# Patient Record
Sex: MALE | Race: WHITE | Employment: FULL TIME | URBAN - METROPOLITAN AREA
[De-identification: names, ages, dates, MRNs, and addresses within clinical notes are randomized per-mention and may not be internally consistent; named-entity substitution may affect disease eponyms.]

---

## 2018-05-10 ENCOUNTER — HOSPITAL ENCOUNTER (OUTPATIENT)
Dept: ULTRASOUND IMAGING | Age: 23
Discharge: HOME | End: 2018-05-10
Attending: FAMILY MEDICINE
Payer: COMMERCIAL

## 2018-05-10 DIAGNOSIS — N50.82 SCROTAL PAIN: ICD-10-CM

## 2018-05-10 PROCEDURE — 76870 US EXAM SCROTUM: CPT

## 2018-05-11 NOTE — PROGRESS NOTES
Spoke with patient regarding US and she said its the same pain his had. He said it hurts more when he is sitting down. He also mentioned he has taken Celebrex for a week one time and it didn't help but if there is something you think will help he will take it.  His pharmacy is [facility] in [facility]

## 2018-05-11 NOTE — PROGRESS NOTES
The testicular US didn't show anything abnormal with his left testicle. Maybe it is back pain that is referring to his testicle. Is the pain the same? Would he be willing to try an anti-inflammatory for a few days?

## 2018-05-14 NOTE — PROGRESS NOTES
Anything I could have called in would have been the same kind of medicine. I think the best thing to do is get an abdominal CT to make sure there isn't an abdominal hernia. I will put the order in. He should be getting a call about scheduling it soon.

## 2018-05-14 NOTE — PROGRESS NOTES
Patient was notified and understood. He will be looking out for a call to schedule the CT. He mentioned that the pain is getting worse.

## 2018-05-21 ENCOUNTER — HOSPITAL ENCOUNTER (OUTPATIENT)
Dept: CT IMAGING | Age: 23
Discharge: HOME | End: 2018-05-21
Attending: FAMILY MEDICINE
Payer: COMMERCIAL

## 2018-05-21 DIAGNOSIS — R10.32 LEFT LOWER QUADRANT PAIN: ICD-10-CM

## 2018-05-21 PROCEDURE — 74011636320 HC RX REV CODE- 636/320: Performed by: FAMILY MEDICINE

## 2018-05-21 PROCEDURE — 74177 CT ABD & PELVIS W/CONTRAST: CPT

## 2018-05-21 PROCEDURE — 74011000258 HC RX REV CODE- 258: Performed by: FAMILY MEDICINE

## 2018-05-21 RX ORDER — SODIUM CHLORIDE 0.9 % (FLUSH) 0.9 %
10 SYRINGE (ML) INJECTION
Status: COMPLETED | OUTPATIENT
Start: 2018-05-21 | End: 2018-05-21

## 2018-05-21 RX ADMIN — SODIUM CHLORIDE 100 ML: 900 INJECTION, SOLUTION INTRAVENOUS at 12:21

## 2018-05-21 RX ADMIN — Medication 10 ML: at 12:21

## 2018-05-21 RX ADMIN — IOPAMIDOL 100 ML: 755 INJECTION, SOLUTION INTRAVENOUS at 12:21

## 2018-06-05 ENCOUNTER — HOSPITAL ENCOUNTER (OUTPATIENT)
Dept: MRI IMAGING | Age: 23
Discharge: HOME | End: 2018-06-05
Attending: FAMILY MEDICINE
Payer: COMMERCIAL

## 2018-06-05 DIAGNOSIS — R10.32 LLQ ABDOMINAL PAIN: ICD-10-CM

## 2018-06-05 DIAGNOSIS — M54.50 ACUTE BILATERAL LOW BACK PAIN WITHOUT SCIATICA: ICD-10-CM

## 2018-06-05 PROCEDURE — 72148 MRI LUMBAR SPINE W/O DYE: CPT

## 2018-07-16 PROBLEM — E66.01 SEVERE OBESITY (BMI 35.0-39.9): Status: ACTIVE | Noted: 2018-07-16

## 2018-07-26 ENCOUNTER — HOSPITAL ENCOUNTER (EMERGENCY)
Age: 23
Discharge: HOME | End: 2018-07-26
Attending: EMERGENCY MEDICINE
Payer: OTHER MISCELLANEOUS

## 2018-07-26 VITALS
HEART RATE: 62 BPM | OXYGEN SATURATION: 99 % | RESPIRATION RATE: 16 BRPM | HEIGHT: 70 IN | SYSTOLIC BLOOD PRESSURE: 138 MMHG | DIASTOLIC BLOOD PRESSURE: 80 MMHG | BODY MASS INDEX: 39.37 KG/M2 | TEMPERATURE: 98.1 F | WEIGHT: 275 LBS
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
## 2018-07-26 DIAGNOSIS — S61.452A CAT BITE OF LEFT HAND, INITIAL ENCOUNTER: Primary | ICD-10-CM

## 2018-07-26 LAB
BASOPHILS # BLD: 0 K/UL (ref 0–0.2)
BASOPHILS NFR BLD: 0 % (ref 0–2)
CRP SERPL-MCNC: 0.3 MG/DL (ref 0–0.9)
DIFFERENTIAL METHOD BLD: ABNORMAL
EOSINOPHIL # BLD: 0.2 K/UL (ref 0–0.8)
EOSINOPHIL NFR BLD: 2 % (ref 0.5–7.8)
ERYTHROCYTE [DISTWIDTH] IN BLOOD BY AUTOMATED COUNT: 12.6 % (ref 11.9–14.6)
HCT VFR BLD AUTO: 45.7 % (ref 41.1–50.3)
HGB BLD-MCNC: 16.2 G/DL (ref 13.6–17.2)
IMM GRANULOCYTES # BLD: 0 K/UL (ref 0–0.5)
IMM GRANULOCYTES NFR BLD AUTO: 0 % (ref 0–5)
LYMPHOCYTES # BLD: 2.7 K/UL (ref 0.5–4.6)
LYMPHOCYTES NFR BLD: 33 % (ref 13–44)
MCH RBC QN AUTO: 30.2 PG (ref 26.1–32.9)
MCHC RBC AUTO-ENTMCNC: 35.4 G/DL (ref 31.4–35)
MCV RBC AUTO: 85.3 FL (ref 79.6–97.8)
MONOCYTES # BLD: 0.8 K/UL (ref 0.1–1.3)
MONOCYTES NFR BLD: 10 % (ref 4–12)
NEUTS SEG # BLD: 4.4 K/UL (ref 1.7–8.2)
NEUTS SEG NFR BLD: 55 % (ref 43–78)
PLATELET # BLD AUTO: 264 K/UL (ref 150–450)
PMV BLD AUTO: 10.1 FL (ref 10.8–14.1)
RBC # BLD AUTO: 5.36 M/UL (ref 4.23–5.67)
WBC # BLD AUTO: 8.1 K/UL (ref 4.3–11.1)

## 2018-07-26 PROCEDURE — 96375 TX/PRO/DX INJ NEW DRUG ADDON: CPT | Performed by: EMERGENCY MEDICINE

## 2018-07-26 PROCEDURE — 85025 COMPLETE CBC W/AUTO DIFF WBC: CPT | Performed by: EMERGENCY MEDICINE

## 2018-07-26 PROCEDURE — 96365 THER/PROPH/DIAG IV INF INIT: CPT | Performed by: EMERGENCY MEDICINE

## 2018-07-26 PROCEDURE — 74011000258 HC RX REV CODE- 258: Performed by: EMERGENCY MEDICINE

## 2018-07-26 PROCEDURE — 86140 C-REACTIVE PROTEIN: CPT | Performed by: EMERGENCY MEDICINE

## 2018-07-26 PROCEDURE — 74011250636 HC RX REV CODE- 250/636: Performed by: EMERGENCY MEDICINE

## 2018-07-26 PROCEDURE — 99283 EMERGENCY DEPT VISIT LOW MDM: CPT | Performed by: EMERGENCY MEDICINE

## 2018-07-26 PROCEDURE — 74011250637 HC RX REV CODE- 250/637: Performed by: EMERGENCY MEDICINE

## 2018-07-26 RX ORDER — SODIUM CHLORIDE 0.9 % (FLUSH) 0.9 %
5-10 SYRINGE (ML) INJECTION AS NEEDED
Status: DISCONTINUED | OUTPATIENT
Start: 2018-07-26 | End: 2018-07-26 | Stop reason: HOSPADM

## 2018-07-26 RX ORDER — HYDROCODONE BITARTRATE AND ACETAMINOPHEN 5; 325 MG/1; MG/1
1 TABLET ORAL
Status: COMPLETED | OUTPATIENT
Start: 2018-07-26 | End: 2018-07-26

## 2018-07-26 RX ORDER — HYDROCODONE BITARTRATE AND ACETAMINOPHEN 5; 325 MG/1; MG/1
1 TABLET ORAL
Qty: 12 TAB | Refills: 0 | Status: SHIPPED | OUTPATIENT
Start: 2018-07-26 | End: 2018-08-10

## 2018-07-26 RX ORDER — SODIUM CHLORIDE 0.9 % (FLUSH) 0.9 %
5-10 SYRINGE (ML) INJECTION EVERY 8 HOURS
Status: DISCONTINUED | OUTPATIENT
Start: 2018-07-26 | End: 2018-07-26 | Stop reason: HOSPADM

## 2018-07-26 RX ORDER — KETOROLAC TROMETHAMINE 30 MG/ML
30 INJECTION, SOLUTION INTRAMUSCULAR; INTRAVENOUS
Status: COMPLETED | OUTPATIENT
Start: 2018-07-26 | End: 2018-07-26

## 2018-07-26 RX ADMIN — KETOROLAC TROMETHAMINE 30 MG: 30 INJECTION, SOLUTION INTRAMUSCULAR at 02:47

## 2018-07-26 RX ADMIN — AMPICILLIN SODIUM AND SULBACTAM SODIUM 3 G: 2; 1 INJECTION, POWDER, FOR SOLUTION INTRAMUSCULAR; INTRAVENOUS at 03:05

## 2018-07-26 RX ADMIN — HYDROCODONE BITARTRATE AND ACETAMINOPHEN 1 TABLET: 5; 325 TABLET ORAL at 05:04

## 2018-07-26 NOTE — DISCHARGE INSTRUCTIONS
Animal Bites: Care Instructions  Your Care Instructions  After an animal bite, the biggest concern is infection. The chance of infection depends on the type of animal that bit you, where on your body you were bitten, and your general health. Many animal bites are not closed with stitches, because this can increase the chance of infection. Your bite may take as little as 7 days or as long as several months to heal, depending on how bad it is. Taking good care of your wound at home will help it heal and reduce your chance of infection. The doctor has checked you carefully, but problems can develop later. If you notice any problems or new symptoms, get medical treatment right away. Follow-up care is a key part of your treatment and safety. Be sure to make and go to all appointments, and call your doctor if you are having problems. It's also a good idea to know your test results and keep a list of the medicines you take. How can you care for yourself at home? · If your doctor told you how to care for your wound, follow your doctor's instructions. If you did not get instructions, follow this general advice:  ¨ After 24 to 48 hours, gently wash the wound with clean water 2 times a day. Do not scrub or soak the wound. Don't use hydrogen peroxide or alcohol, which can slow healing. ¨ You may cover the wound with a thin layer of petroleum jelly, such as Vaseline, and a nonstick bandage. ¨ Apply more petroleum jelly and replace the bandage as needed. · After you shower, gently dry the wound with a clean towel. · If your doctor has closed the wound, cover the bandage with a plastic bag before you take a shower. · A small amount of skin redness and swelling around the wound edges and the stitches or staples is normal. Your wound may itch or feel irritated. Do not scratch or rub the wound.   · Ask your doctor if you can take an over-the-counter pain medicine, such as acetaminophen (Tylenol), ibuprofen (Advil, Motrin), or naproxen (Aleve). Read and follow all instructions on the label. · Do not take two or more pain medicines at the same time unless the doctor told you to. Many pain medicines have acetaminophen, which is Tylenol. Too much acetaminophen (Tylenol) can be harmful. · If your bite puts you at risk for rabies, you will get a series of shots over the next few weeks to prevent rabies. Your doctor will tell you when to get the shots. It is very important that you get the full cycle of shots. Follow your doctor's instructions exactly. · You may need a tetanus shot if you have not received one in the last 5 years. · If your doctor prescribed antibiotics, take them as directed. Do not stop taking them just because you feel better. You need to take the full course of antibiotics. When should you call for help? Call your doctor now or seek immediate medical care if:    · The skin near the bite turns cold or pale or it changes color.     · You lose feeling in the area near the bite, or it feels numb or tingly.     · You have trouble moving a limb near the bite.     · You have symptoms of infection, such as:  ¨ Increased pain, swelling, warmth, or redness near the wound. ¨ Red streaks leading from the wound. ¨ Pus draining from the wound. ¨ A fever.     · Blood soaks through the bandage. Oozing small amounts of blood is normal.     · Your pain is getting worse.    Watch closely for changes in your health, and be sure to contact your doctor if you are not getting better as expected. Where can you learn more? Go to http://dante-ike.info/. Enter D365 in the search box to learn more about \"Animal Bites: Care Instructions. \"  Current as of: November 20, 2017  Content Version: 11.7  © 3197-1112 [employer]. Care instructions adapted under license by [employer] (which disclaims liability or warranty for this information).  If you have questions about a medical condition or this instruction, always ask your healthcare professional. [place] [address] any warranty or liability for your use of this information.

## 2018-07-26 NOTE — ED PROVIDER NOTES
HPI Comments: Patient reports being bitten by a feral cat at about noon the day of arrival.he was seen in urgent care and given Augmentin though reports no local wound care done. 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 Since that time the pain does become more swollen and is weeping clear fluid from the puncture wound which is between the index and middle finger in the webspace. 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 reports tetanus status is up-to-date. Patient is a 25 y.o. male presenting with cat bite. The history is provided by the patient. Cat bite    Episode onset: about 14 hours ago. The incident occurred at work. His tetanus status is UTD. Recently, medical care has been given at another facility. Services received include medications given. No past medical history on file. Past Surgical History:   Procedure Laterality Date    HX HEENT  12/17/2010    nose surgery         Family History:   Problem Relation Age of Onset    Hypertension Mother     Heart Disease Father        Social History     Social History    Marital status: SINGLE     Spouse name: N/A    Number of children: N/A    Years of education: N/A     Occupational History    Not on file. Social History Main Topics    Smoking status: Never Smoker    Smokeless tobacco: Never Used    Alcohol use Yes      Comment: socially    Drug use: No    Sexual activity: Not on file     Other Topics Concern    Not on file     Social History Narrative         ALLERGIES: Review of patient's allergies indicates no known allergies. Review of Systems   Constitutional: Negative for chills and fever. All other systems reviewed and are negative.       Vitals:    07/26/18 0144   BP: (!) 148/96   Pulse: (!) 58   Resp: 18   Temp: 98.1 °F (36.7 °C)   SpO2: 99%   Weight: 124.7 kg (275 lb)   Height: 5' 10\" (1.778 m) Physical Exam   Constitutional: He is oriented to person, place, and time. He appears well-developed and well-nourished. No distress. HENT:   Head: Normocephalic and atraumatic. Mouth/Throat: No oropharyngeal exudate. Eyes: Conjunctivae and EOM are normal. Pupils are equal, round, and reactive to light. Neck: Normal range of motion. Neck supple. Cardiovascular: Normal rate, regular rhythm and normal heart sounds. Pulmonary/Chest: Effort normal and breath sounds normal.   Abdominal: Soft. Bowel sounds are normal.   Musculoskeletal: Normal range of motion. He exhibits edema and tenderness. He exhibits no deformity. Right hand is notably swollen but no erythema or warmth is noted there is a clear fluid weeping from a puncture wound in the webspace between the index and middle fingers. Neurological: He is alert and oriented to person, place, and time. Skin: Skin is warm and dry. Psychiatric: He has a normal mood and affect. His behavior is normal.   Nursing note and vitals reviewed. MDM  Number of Diagnoses or Management Options     Amount and/or Complexity of Data Reviewed  Clinical lab tests: ordered and reviewed    Risk of Complications, Morbidity, and/or Mortality  Presenting problems: moderate  Diagnostic procedures: moderate  Management options: moderate  General comments: Differential diagnosis is edema secondary to allergic reaction from the chemicals used to clean the wound versus  Infection from the Bite. Dose of IV Unasyn will be given.     Patient Progress  Patient progress: stable        ED Course       Procedures

## 2018-07-26 NOTE — ED NOTES
\"I was bitten by a cat this morning.  I went to  and they gave me some oral antibiotics but it is getting worse\"

## 2018-07-26 NOTE — ED NOTES
I have reviewed discharge instructions with the patient. The patient verbalized understanding. Patient left ED via Discharge Method: ambulatory to Home with his mom  Opportunity for questions and clarification provided. Patient given 1 scripts. To continue your aftercare when you leave the hospital, you may receive an automated call from our care team to check in on how you are doing. This is a free service and part of our promise to provide the best care and service to meet your aftercare needs.  If you have questions, or wish to unsubscribe from this service please call [phone]. Thank you for Choosing our [facility] Emergency Department.

## 2018-07-26 NOTE — ED TRIAGE NOTES
Pt states that he got bitten by a cat earlier tonight at work. States cat was not up to date on his shots. Patient states that he has gotten all of his rabies immunization shots and that he took the last one today. Noted swelling to L hand; bite marks located between thumb and index fingers. Scratches noted to L wrist.    Was seen at urgent care this afternoon and was prescribed augmentin. States he went back to work and flushed his L hand with chlorahex solution. States since this has become more swollen and painful.

## 2018-07-27 ENCOUNTER — HOSPITAL ENCOUNTER (INPATIENT)
Age: 23
LOS: 3 days | Discharge: HOME | DRG: 603 | End: 2018-07-30
Attending: EMERGENCY MEDICINE | Admitting: HOSPITALIST
Payer: OTHER MISCELLANEOUS

## 2018-07-27 ENCOUNTER — APPOINTMENT (OUTPATIENT)
Dept: GENERAL RADIOLOGY | Age: 23
DRG: 603 | End: 2018-07-27
Attending: EMERGENCY MEDICINE
Payer: OTHER MISCELLANEOUS
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
## 2018-07-27 DIAGNOSIS — S61.452S CAT BITE OF HAND, LEFT, SEQUELA: Primary | ICD-10-CM

## 2018-07-27 DIAGNOSIS — I89.1 LYMPHANGITIS: ICD-10-CM

## 2018-07-27 PROBLEM — L03.114 CELLULITIS OF LEFT UPPER EXTREMITY: Status: ACTIVE | Noted: 2018-07-27

## 2018-07-27 PROBLEM — L03.119 CELLULITIS AND ABSCESS OF HAND: Status: ACTIVE | Noted: 2018-07-27

## 2018-07-27 PROBLEM — L02.519 CELLULITIS AND ABSCESS OF HAND: Status: ACTIVE | Noted: 2018-07-27

## 2018-07-27 LAB
ALBUMIN SERPL-MCNC: 3.6 G/DL (ref 3.5–5)
ALBUMIN/GLOB SERPL: 0.8 {RATIO} (ref 1.2–3.5)
ALP SERPL-CCNC: 72 U/L (ref 50–136)
ALT SERPL-CCNC: 58 U/L (ref 12–65)
ANION GAP SERPL CALC-SCNC: 6 MMOL/L (ref 7–16)
APTT PPP: 25.1 SEC (ref 23.2–35.3)
AST SERPL-CCNC: 47 U/L (ref 15–37)
BASOPHILS # BLD: 0 K/UL (ref 0–0.2)
BASOPHILS NFR BLD: 0 % (ref 0–2)
BILIRUB SERPL-MCNC: 0.9 MG/DL (ref 0.2–1.1)
BUN SERPL-MCNC: 9 MG/DL (ref 6–23)
CALCIUM SERPL-MCNC: 8.9 MG/DL (ref 8.3–10.4)
CHLORIDE SERPL-SCNC: 104 MMOL/L (ref 98–107)
CO2 SERPL-SCNC: 28 MMOL/L (ref 21–32)
CREAT SERPL-MCNC: 0.92 MG/DL (ref 0.8–1.5)
DIFFERENTIAL METHOD BLD: ABNORMAL
EOSINOPHIL # BLD: 0.1 K/UL (ref 0–0.8)
EOSINOPHIL NFR BLD: 1 % (ref 0.5–7.8)
ERYTHROCYTE [DISTWIDTH] IN BLOOD BY AUTOMATED COUNT: 12.5 % (ref 11.9–14.6)
GLOBULIN SER CALC-MCNC: 4.5 G/DL (ref 2.3–3.5)
GLUCOSE SERPL-MCNC: 95 MG/DL (ref 65–100)
HCT VFR BLD AUTO: 46.4 % (ref 41.1–50.3)
HGB BLD-MCNC: 16.6 G/DL (ref 13.6–17.2)
IMM GRANULOCYTES # BLD: 0 K/UL (ref 0–0.5)
IMM GRANULOCYTES NFR BLD AUTO: 0 % (ref 0–5)
INR PPP: 1.1
LACTATE SERPL-SCNC: 0.9 MMOL/L (ref 0.4–2)
LYMPHOCYTES # BLD: 2.3 K/UL (ref 0.5–4.6)
LYMPHOCYTES NFR BLD: 26 % (ref 13–44)
MCH RBC QN AUTO: 30.2 PG (ref 26.1–32.9)
MCHC RBC AUTO-ENTMCNC: 35.8 G/DL (ref 31.4–35)
MCV RBC AUTO: 84.4 FL (ref 79.6–97.8)
MONOCYTES # BLD: 0.6 K/UL (ref 0.1–1.3)
MONOCYTES NFR BLD: 7 % (ref 4–12)
NEUTS SEG # BLD: 5.7 K/UL (ref 1.7–8.2)
NEUTS SEG NFR BLD: 66 % (ref 43–78)
PLATELET # BLD AUTO: 255 K/UL (ref 150–450)
PMV BLD AUTO: 9.6 FL (ref 10.8–14.1)
POTASSIUM SERPL-SCNC: 5.1 MMOL/L (ref 3.5–5.1)
PROT SERPL-MCNC: 8.1 G/DL (ref 6.3–8.2)
PROTHROMBIN TIME: 13.4 SEC (ref 11.5–14.5)
RBC # BLD AUTO: 5.5 M/UL (ref 4.23–5.67)
SODIUM SERPL-SCNC: 138 MMOL/L (ref 136–145)
WBC # BLD AUTO: 8.8 K/UL (ref 4.3–11.1)

## 2018-07-27 PROCEDURE — 80053 COMPREHEN METABOLIC PANEL: CPT | Performed by: EMERGENCY MEDICINE

## 2018-07-27 PROCEDURE — 65660000000 HC RM CCU STEPDOWN

## 2018-07-27 PROCEDURE — 74011000250 HC RX REV CODE- 250: Performed by: HOSPITALIST

## 2018-07-27 PROCEDURE — 73130 X-RAY EXAM OF HAND: CPT

## 2018-07-27 PROCEDURE — 87040 BLOOD CULTURE FOR BACTERIA: CPT | Performed by: HOSPITALIST

## 2018-07-27 PROCEDURE — 85610 PROTHROMBIN TIME: CPT | Performed by: EMERGENCY MEDICINE

## 2018-07-27 PROCEDURE — 74011250636 HC RX REV CODE- 250/636: Performed by: EMERGENCY MEDICINE

## 2018-07-27 PROCEDURE — 99282 EMERGENCY DEPT VISIT SF MDM: CPT | Performed by: EMERGENCY MEDICINE

## 2018-07-27 PROCEDURE — 74011000258 HC RX REV CODE- 258: Performed by: HOSPITALIST

## 2018-07-27 PROCEDURE — 74011250637 HC RX REV CODE- 250/637: Performed by: HOSPITALIST

## 2018-07-27 PROCEDURE — 74011250636 HC RX REV CODE- 250/636: Performed by: HOSPITALIST

## 2018-07-27 PROCEDURE — 83605 ASSAY OF LACTIC ACID: CPT | Performed by: HOSPITALIST

## 2018-07-27 PROCEDURE — 85730 THROMBOPLASTIN TIME PARTIAL: CPT | Performed by: EMERGENCY MEDICINE

## 2018-07-27 PROCEDURE — 85025 COMPLETE CBC W/AUTO DIFF WBC: CPT | Performed by: EMERGENCY MEDICINE

## 2018-07-27 PROCEDURE — 74011250636 HC RX REV CODE- 250/636: Performed by: PHYSICIAN ASSISTANT

## 2018-07-27 RX ORDER — SODIUM CHLORIDE 0.9 % (FLUSH) 0.9 %
5-10 SYRINGE (ML) INJECTION EVERY 8 HOURS
Status: DISCONTINUED | OUTPATIENT
Start: 2018-07-27 | End: 2018-07-30 | Stop reason: HOSPADM

## 2018-07-27 RX ORDER — MORPHINE SULFATE 2 MG/ML
4 INJECTION, SOLUTION INTRAMUSCULAR; INTRAVENOUS ONCE
Status: COMPLETED | OUTPATIENT
Start: 2018-07-27 | End: 2018-07-27

## 2018-07-27 RX ORDER — HYDROCODONE BITARTRATE AND ACETAMINOPHEN 5; 325 MG/1; MG/1
1 TABLET ORAL
Status: DISCONTINUED | OUTPATIENT
Start: 2018-07-27 | End: 2018-07-30 | Stop reason: HOSPADM

## 2018-07-27 RX ORDER — ONDANSETRON 2 MG/ML
4 INJECTION INTRAMUSCULAR; INTRAVENOUS ONCE
Status: COMPLETED | OUTPATIENT
Start: 2018-07-27 | End: 2018-07-27

## 2018-07-27 RX ORDER — SODIUM CHLORIDE 9 MG/ML
100 INJECTION, SOLUTION INTRAVENOUS ONCE
Status: COMPLETED | OUTPATIENT
Start: 2018-07-27 | End: 2018-07-27

## 2018-07-27 RX ORDER — MORPHINE SULFATE 10 MG/ML
4-6 INJECTION, SOLUTION INTRAMUSCULAR; INTRAVENOUS
Status: DISCONTINUED | OUTPATIENT
Start: 2018-07-27 | End: 2018-07-28

## 2018-07-27 RX ORDER — SODIUM CHLORIDE 0.9 % (FLUSH) 0.9 %
5-10 SYRINGE (ML) INJECTION AS NEEDED
Status: DISCONTINUED | OUTPATIENT
Start: 2018-07-27 | End: 2018-07-30 | Stop reason: HOSPADM

## 2018-07-27 RX ORDER — ACETAMINOPHEN 325 MG/1
650 TABLET ORAL
Status: DISCONTINUED | OUTPATIENT
Start: 2018-07-27 | End: 2018-07-30 | Stop reason: HOSPADM

## 2018-07-27 RX ORDER — SODIUM CHLORIDE 9 MG/ML
100 INJECTION, SOLUTION INTRAVENOUS CONTINUOUS
Status: DISPENSED | OUTPATIENT
Start: 2018-07-27 | End: 2018-07-28

## 2018-07-27 RX ORDER — MORPHINE SULFATE 2 MG/ML
2 INJECTION, SOLUTION INTRAMUSCULAR; INTRAVENOUS
Status: DISCONTINUED | OUTPATIENT
Start: 2018-07-27 | End: 2018-07-27

## 2018-07-27 RX ADMIN — MORPHINE SULFATE 4 MG: 2 INJECTION, SOLUTION INTRAMUSCULAR; INTRAVENOUS at 17:38

## 2018-07-27 RX ADMIN — SODIUM CHLORIDE 100 ML/HR: 900 INJECTION, SOLUTION INTRAVENOUS at 17:34

## 2018-07-27 RX ADMIN — ONDANSETRON 4 MG: 2 INJECTION, SOLUTION INTRAMUSCULAR; INTRAVENOUS at 17:37

## 2018-07-27 RX ADMIN — MORPHINE SULFATE 6 MG: 10 INJECTION INTRAVENOUS at 20:37

## 2018-07-27 RX ADMIN — HYDROCODONE BITARTRATE AND ACETAMINOPHEN 1 TABLET: 5; 325 TABLET ORAL at 22:12

## 2018-07-27 RX ADMIN — SODIUM CHLORIDE 600 MG: 900 INJECTION, SOLUTION INTRAVENOUS at 20:40

## 2018-07-27 RX ADMIN — SODIUM CHLORIDE 1 G: 900 INJECTION, SOLUTION INTRAVENOUS at 18:13

## 2018-07-27 NOTE — ED NOTES
TRANSFER - OUT REPORT: 
 
Verbal report given to RN Kylah(name) on Kourtney Rodriguez  being transferred to 220(unit) for routine progression of care Report consisted of patients Situation, Background, Assessment and  
Recommendations(SBAR). Information from the following report(s) SBAR, [facility], ED Notes was reviewed with the receiving nurse. Lines:    
 
Opportunity for questions and clarification was provided. Patient transported with: 
 iv medication

## 2018-07-27 NOTE — IP AVS SNAPSHOT
Nicole Garcia 
 
 
 [address] [facility] 
[phone] Patient: Bridgett Livingston MRN: [id number] About your hospitalization You were admitted on:  July 27, 2018 [facility] You were discharged on:  July 30, 2018 Why you were hospitalized Your primary diagnosis was:  Cellulitis Of Left Upper Extremity Your diagnoses also included:  Severe Obesity (Bmi 35.0-39.9) ([facility]) Follow-up Information Follow up With Details Comments Contact Info Pankaj Daigle DO On 8/13/2018 8:45 WILL SEE PA [address] 
[phone] Your Scheduled Appointments Thursday August 09, 2018  1:15 PM EDT New Patient with Mario Farley MD  
[facility] [address] [place] [address] [facility]  
[phone] Monday August 13, 2018  8:45 AM EDT Office Visit with Felipa Garcia PA-C [address] [facility] [address]  
[phone] Wednesday August 22, 2018  8:15 AM EDT Office Visit with Eve Caruso MD  
[facility] ([facility] [address] [facility] [address] [facility] [address]  
[phone] Discharge Orders None A check katie indicates which time of day the medication should be taken. My Medications START taking these medications Instructions Each Dose to Equal  
 Morning Noon Evening Bedtime  
 doxycycline 100 mg capsule Commonly known as:  David Setter Your next dose is: Take as directed Take 1 Cap by mouth two (2) times a day. 100 mg CONTINUE taking these medications Instructions Each Dose to Equal  
 Morning Noon Evening Bedtime HYDROcodone-acetaminophen 5-325 mg per tablet Commonly known as:  Benetta Pock  
 Your next dose is: Take on as needed schedule Take 1 Tab by mouth every four (4) hours as needed for Pain. Max Daily Amount: 6 Tabs. 1 Tab STOP taking these medications   
 amoxicillin-clavulanate 875-125 mg per tablet Commonly known as:  AUGMENTIN Where to Get Your Medications Information on where to get these meds will be given to you by the nurse or doctor. ! Ask your nurse or doctor about these medications  
  doxycycline 100 mg capsule Opioid Education Prescription Opioids: What You Need to Know: 
 
 
 
F-face looks uneven A-arms unable to move or move unevenly S-speech slurred or non-existent T-time-call 911 as soon as signs and symptoms begin-DO NOT go Back to bed or wait to see if you get better-TIME IS BRAIN. Warning Signs of HEART ATTACK Call 911 if you have these symptoms: 
? Chest discomfort. Most heart attacks involve discomfort in the center of the chest that lasts more than a few minutes, or that goes away and comes back. It can feel like uncomfortable pressure, squeezing, fullness, or pain. ? Discomfort in other areas of the upper body. Symptoms can include pain or discomfort in one or both arms, the back, neck, jaw, or stomach. ? Shortness of breath with or without chest discomfort. ? Other signs may include breaking out in a cold sweat, nausea, or lightheadedness. Don't wait more than five minutes to call [address]! Fast action can save your life. Calling 911 is almost always the fastest way to get lifesaving treatment. Emergency Medical Services staff can begin treatment when they arrive  up to an hour sooner than if someone gets to the hospital by car. The discharge information has been reviewed with the patient. The patient verbalized understanding. Discharge medications reviewed with the patient and appropriate educational materials and side effects teaching were provided. ___________________________________________________________________________________________________________________________________ Cellulitis: Care Instructions Your Care Instructions Cellulitis is a skin infection caused by bacteria, most often strep or staph. It often occurs after a break in the skin from a scrape, cut, bite, or puncture, or after a rash. Cellulitis may be treated without doing tests to find out what caused it. But your doctor may do tests, if needed, to look for a specific bacteria, like methicillin-resistant Staphylococcus aureus (MRSA). The doctor has checked you carefully, but problems can develop later. If you notice any problems or new symptoms, get medical treatment right away. Follow-up care is a key part of your treatment and safety. Be sure to make and go to all appointments, and call your doctor if you are having problems. It's also a good idea to know your test results and keep a list of the medicines you take. How can you care for yourself at home? · Take your antibiotics as directed. Do not stop taking them just because you feel better. You need to take the full course of antibiotics. · Prop up the infected area on pillows to reduce pain and swelling. Try to keep the area above the level of your heart as often as you can.  
· If your doctor told you how to care for your wound, follow your doctor's instructions. If you did not get instructions, follow this general advice: ¨ Wash the wound with clean water 2 times a day. Don't use hydrogen peroxide or alcohol, which can slow healing. ¨ You may cover the wound with a thin layer of petroleum jelly, such as Vaseline, and a nonstick bandage. ¨ Apply more petroleum jelly and replace the bandage as needed. · Be safe with medicines. Take pain medicines exactly as directed. ¨ If the doctor gave you a prescription medicine for pain, take it as prescribed. ¨ If you are not taking a prescription pain medicine, ask your doctor if you can take an over-the-counter medicine. To prevent cellulitis in the future · Try to prevent cuts, scrapes, or other injuries to your skin. Cellulitis most often occurs where there is a break in the skin. · If you get a scrape, cut, mild burn, or bite, wash the wound with clean water as soon as you can to help avoid infection. Don't use hydrogen peroxide or alcohol, which can slow healing. · If you have swelling in your legs (edema), support stockings and good skin care may help prevent leg sores and cellulitis. · Take care of your feet, especially if you have diabetes or other conditions that increase the risk of infection. Wear shoes and socks. Do not go barefoot. If you have athlete's foot or other skin problems on your feet, talk to your doctor about how to treat them. When should you call for help? Call your doctor now or seek immediate medical care if: 
  · You have signs that your infection is getting worse, such as: 
¨ Increased pain, swelling, warmth, or redness. ¨ Red streaks leading from the area. ¨ Pus draining from the area. ¨ A fever.  
  · You get a rash.  
 Watch closely for changes in your health, and be sure to contact your doctor if: 
  · You do not get better as expected. Where can you learn more? Go to http://dante-ike.info/. Emmanuel Camara in the search box to learn more about \"Cellulitis: Care Instructions. \" Current as of: May 10, 2017 Content Version: 11.7 © 0920-9064 [employer]. Care instructions adapted under license by [employer] (which disclaims liability or warranty for this information). If you have questions about a medical condition or this instruction, always ask your healthcare professional. [address] any warranty or liability for your use of this information. EasyCopay Announcement We are excited to announce that we are making your provider's discharge notes available to you in EasyCopay. You will see these notes when they are completed and signed by the physician that discharged you from your recent hospital stay. If you have any questions or concerns about any information you see in EasyCopay, please call the Health Information Department where you were seen or reach out to your Primary Care Provider for more information about your plan of care. Introducing [facility]! Miko Pal introduces EasyCopay patient portal. Now you can access parts of your medical record, email your doctor's office, and request medication refills online. 1. In your internet browser, go to [email or web address] 2. Click on the First Time User? Click Here link in the Sign In box. You will see the New Member Sign Up page. 3. Enter your EasyCopay Access Code exactly as it appears below. You will not need to use this code after youve completed the sign-up process. If you do not sign up before the expiration date, you must request a new code. · EasyCopay Access Code: [email or web address] Expires: 8/2/2018 10:30 AM 
 
4. Enter the last four digits of your Social Security Number (xxxx) and Date of Birth (mm/dd/yyyy) as indicated and click Submit. You will be taken to the next sign-up page. 5. Create a Ayi Laile ID. This will be your Ayi Laile login ID and cannot be changed, so think of one that is secure and easy to remember. 6. Create a Ayi Laile password. You can change your password at any time. 7. Enter your Password Reset Question and Answer. This can be used at a later time if you forget your password. 8. Enter your e-mail address. You will receive e-mail notification when new information is available in [facility] 9. Click Sign Up. You can now view and download portions of your medical record. 10. Click the Download Summary menu link to download a portable copy of your medical information. If you have questions, please visit the Frequently Asked Questions section of the Ayi Laile website. Remember, Ayi Laile is NOT to be used for urgent needs. For medical emergencies, dial 911. Now available from your iPhone and Android! Introducing Evgeny Denney As a [employer] patient, I wanted to make you aware of our electronic visit tool called Evgeny Denney. [employer] 24/7 allows you to connect within minutes with a medical provider 24 hours a day, seven days a week via a mobile device or tablet or logging into a secure website from your computer. You can access Evgeny Denney from anywhere in the [place]. A virtual visit might be right for you when you have a simple condition and feel like you just dont want to get out of bed, or cant get away from work for an appointment, when your regular [employer] provider is not available (evenings, weekends or holidays), or when youre out of town and need minor care. Electronic visits cost only $49 and if the [employer] 24/7 provider determines a prescription is needed to treat your condition, one can be electronically transmitted to a nearby pharmacy*. Please take a moment to enroll today if you have not already done so. The enrollment process is free and takes just a few minutes.   To enroll, please download the lmbang 24/7 sunshine to your tablet or phone, or visit [email or web address] to enroll on your computer. And, as an [address] [place] [address] patient with a [employer] account, the results of your visits will be scanned into your electronic medical record and your primary care provider will be able to view the scanned results. We urge you to continue to see your regular [facility] provider for your ongoing medical care. And while your primary care provider may not be the one available when you seek a [employer] [facility] virtual visit, the peace of mind you get from getting a real diagnosis real time can be priceless. For more information on [employer] [facility], view our Frequently Asked Questions (FAQs) at [email or web address]. Sincerely, 
 
Elsa Dubose MD 
Chief Medical Officer Robin Josy Johnson *:  certain medications cannot be prescribed via [employer] Unresulted Labs-Please follow up with your PCP about these lab tests Order Current Status CULTURE, BLOOD Preliminary result CULTURE, BLOOD Preliminary result Providers Seen During Your Hospitalization Provider Specialty Primary office phone Antonia Cabral MD Emergency Medicine [phone] Malcolm Burgess MD [facility] [phone] Your Primary Care Physician (PCP) Primary Care Physician Office Phone Office Fax Jack Yao [phone] You are allergic to the following No active allergies Recent Documentation Height Weight BMI Smoking Status 1.778 m 122.5 kg 38.74 kg/m2 Never Smoker Emergency Contacts Name Discharge Info Relation Home Work Mobile [facility] HOSPITAL DISCHARGE CAREGIVER [3] Mother [14] [phone] Patient Belongings  The following personal items are in your possession at time of discharge: 
  Dental Appliances: None         Home Medications: None   Jewelry: None Clothing: At bedside, Footwear, Pants, Shirt, Socks, Undergarments    Other Valuables: [employer] Please provide this summary of care documentation to your next provider. Signatures-by signing, you are acknowledging that this After Visit Summary has been reviewed with you and you have received a copy. Patient Signature:  ____________________________________________________________ Date:  ____________________________________________________________  
  
Marti Clif Provider Signature:  ____________________________________________________________ Date:  ____________________________________________________________

## 2018-07-27 NOTE — ED PROVIDER NOTES
Patient is a 25 y.o. male presenting with cat bite. The history is provided by the patient. Cat bite The incident occurred more than 2 days ago. The incident occurred at work. He came to the ER via personal transport. There is an injury to the left hand. The pain is moderate. It is unknown if a foreign body is present. Associated symptoms include numbness, nausea, tingling and weakness. Pertinent negatives include no chest pain, no fussiness, no visual disturbance, no abdominal pain, no bowel incontinence, no vomiting, no bladder incontinence, no headaches, no hearing loss, no focal weakness, no cough and no difficulty breathing. There have been no prior injuries to these areas. He is right-handed. His tetanus status is UTD. He has been behaving normally. There were no sick contacts. Recently, medical care has been given by the PCP and at another facility. Services received include medications given and tests performed. Healthy 67-year-old male presenting for worsening pain and swelling of his left hand. He was bitten by a cat 2 days ago. She was started on Augmentin and the pain worsened. He presented to his PCP was given IV antibiotics. His pain continued to worsen and he was sent here for referral and possible evaluation by hand surgery. Patient is concerned because pain and swelling as well as redness which originated over the second MCP has now extended across the wrist and up the forearm. History reviewed. No pertinent past medical history. Past Surgical History:  
Procedure Laterality Date  HX HEENT  12/17/2010  
 nose surgery Family History:  
Problem Relation Age of Onset  Hypertension Mother  Heart Disease Father Social History Social History  Marital status: SINGLE Spouse name: N/A  
 Number of children: N/A  
 Years of education: N/A Occupational History  Not on file. Social History Main Topics  Smoking status: Never Smoker  Smokeless tobacco: Never Used  Alcohol use Yes Comment: socially  Drug use: No  
 Sexual activity: Not on file Other Topics Concern  Not on file Social History Narrative ALLERGIES: Review of patient's allergies indicates no known allergies. Review of Systems HENT: Negative for hearing loss. Eyes: Negative for visual disturbance. Respiratory: Negative for cough. Cardiovascular: Negative for chest pain. Gastrointestinal: Positive for nausea. Negative for abdominal pain, bowel incontinence and vomiting. Genitourinary: Negative for bladder incontinence. Neurological: Positive for tingling, weakness and numbness. Negative for focal weakness and headaches. All other systems reviewed and are negative. Vitals:  
 07/27/18 1454 BP: (!) 130/92 Pulse: 79 Resp: 18 Temp: 98.6 °F (37 °C) SpO2: 96% Weight: 122.5 kg (270 lb) Height: 5' 10\" (1.778 m) Physical Exam  
Constitutional: He is oriented to person, place, and time. He appears well-developed and well-nourished. HENT:  
Head: Normocephalic and atraumatic. Eyes: Conjunctivae are normal. Pupils are equal, round, and reactive to light. Neck: Normal range of motion. Neck supple. Cardiovascular: Normal rate and regular rhythm. Pulmonary/Chest: Effort normal and breath sounds normal.  
Abdominal: Soft. Bowel sounds are normal.  
Musculoskeletal:  
Obvious puncture wound over the medial and lateral aspects of the second MCPof the left hand, surrounding soft tissue swelling, erythema, lymphangitis extending up the heel aspect of the hand, forearm towards the elbow, tenderness to palpation, tenderness to flexion and extension of the hand, reported decreased sensation in the edematous portions of the hand. Neurological: He is alert and oriented to person, place, and time. Skin: There is erythema. Positive for swelling Nursing note and vitals reviewed. MDM Number of Diagnoses or Management Options Cat bite of hand, left, sequela:  
Lymphangitis:  
Diagnosis management comments: 81-year-old male presenting for Bite to the left hand that is progressively worsening in spite of antibiotic treatment and pain control. Concern for tenosynovitis versus worsening lymphangitis secondary to cat bite. Patient has already been seen several times for this injury. Started on oral antibiotics but swelling pain is worsening. Given 1 dose of IV antibiotics pain and swelling is worsening. 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 Tetanus has been updated prior to this visit. Amount and/or Complexity of Data Reviewed Clinical lab tests: ordered and reviewed Tests in the radiology section of CPT®: ordered and reviewed Tests in the medicine section of CPT®: ordered and reviewed Discuss the patient with other providers: yes Independent visualization of images, tracings, or specimens: yes Risk of Complications, Morbidity, and/or Mortality Presenting problems: high Diagnostic procedures: high Management options: high Patient Progress Patient progress: improved ED Course Comment By Time Consult to the hospitalist service for admission, IV antibiotics and possible hand surgery consultation Juve Hearn MD 07/27 9978 Procedures

## 2018-07-27 NOTE — ED TRIAGE NOTES
Pt bit by cat on Wednesday. Seen at urgent care, placed on antibiotics. Worsened pain on Thursday, came to ED, given IV antibiotics. Worsening pain today. Seen at PCP. Sent here by PCP to be evaluated by surgeon. Pt reports left hand numbness and significant swelling present in triage.

## 2018-07-27 NOTE — PROGRESS NOTES
TRANSFER - IN REPORT: 
 
Verbal report received from Mati Brito RN(name) on Elisabeth Colón  being received from ED(unit) for routine progression of care Report consisted of patients Situation, Background, Assessment and  
Recommendations(SBAR). Information from the following report(s) SBAR, Kardex, ED Summary, OR Summary, Procedure Summary, Intake/Output and MAR was reviewed with the receiving nurse. Opportunity for questions and clarification was provided. Assessment completed upon patients arrival to unit and care assumed.

## 2018-07-27 NOTE — H&P
HOSPITALIST H&P/CONSULT 
NAME:  Carol Angulo Age:  25 y.o. 
:   1995 DOS:               18 MRN:   [id number] PCP: Judge Juan DO Consulting MD: Treatment Team: Attending Provider: Juice Samuel MD; Primary Nurse: Harriet Suazo RN 
 
HPI:  
Mr. Kim Morales with PMHx of morbid obesity who presented to Ed complaining of L hand erythema, swelling and pain after a cat bite at work 3 days prior. Works at a  office. laced on PO Augmentin for last 2 days, although despite the medications his symptoms got worse and now there are present up to forearm. Tetanus up todate. ED work-up showed WBC:8.8. xrays L hand pending. Started on Cefepime per Ed provider. 10+ point ROS done and is negative except as noted in HPI. History reviewed. No pertinent past medical history. Past Surgical History:  
Procedure Laterality Date  HX HEENT  2010  
 nose surgery Prior to Admission Medications Prescriptions Last Dose Informant Patient Reported? Taking? HYDROcodone-acetaminophen (NORCO) 5-325 mg per tablet   No No  
Sig: Take 1 Tab by mouth every four (4) hours as needed for Pain. Max Daily Amount: 6 Tabs. amoxicillin-clavulanate (AUGMENTIN) 875-125 mg per tablet   Yes No  
Sig: TAKE 1 TABLET BY MOUTH EVERY 12 HOURS Facility-Administered Medications: None Home meds reconciled. No Known Allergies Social History Substance Use Topics  Smoking status: Never Smoker  Smokeless tobacco: Never Used  Alcohol use Yes Comment: socially Family History Problem Relation Age of Onset  Hypertension Mother  Heart Disease Father I personally reviewed home medications, social and family history. Immunization History Administered Date(s) Administered  Influenza Vaccine PF 2011, 10/22/2012, 10/18/2013, 12/15/2015, 2015  Meningococcal (MCV4O) Vaccine 2014  TB Skin Test (PPD) Intradermal 2015 Objective:  
 
Patient Vitals for the past 24 hrs: 
 Temp Pulse Resp BP SpO2  
18 98.6 °F (37 °C) 79 18 (!) 130/92 96 % Temp (24hrs), Av.4 °F (36.9 °C), Min:98.2 °F (36.8 °C), Max:98.6 °F (37 °C) Oxygen Therapy O2 Sat (%): 96 % (18 145) O2 Device: Room air (18) Oxygen Therapy O2 Sat (%): 96 % (18) O2 Device: Room air (18) Physical Exam: 
General:         Alert, cooperative, no distress . Obese HEENT:               NCAT. No obvious deformity. Nares normal. No drainage Lungs:                  CTABL. No wheezing/rhonchi/rales  RA Cardiovascular:   RRR. No m/r/g. No pedal edema b/l. +2 PT/DT pulses b/l. Abdomen:       S/nt/nd. Bowel sounds normal. .  
Skin:         L hand with erythema and swelling up to lower 1/3 of fore arm. palpating radial pulse. Puncture wound over second MCP. Not Jaundiced Neurologic:    AAOx3. Go Broach No gross focal deficit. Moves all extremities. Psychiatric:         Good mood. Normal affect. Denies any SI/HI. Data Review:  
Recent Results (from the past 24 hour(s)) CBC WITH AUTOMATED DIFF Collection Time: 18  3:03 PM  
Result Value Ref Range WBC 8.8 4.3 - 11.1 K/uL  
 RBC 5.50 4.23 - 5.67 M/uL  
 HGB 16.6 13.6 - 17.2 g/dL HCT 46.4 41.1 - 50.3 % MCV 84.4 79.6 - 97.8 FL  
 MCH 30.2 26.1 - 32.9 PG  
 MCHC 35.8 (H) 31.4 - 35.0 g/dL  
 RDW 12.5 11.9 - 14.6 % PLATELET 682 994 - 932 K/uL MPV 9.6 (L) 10.8 - 14.1 FL  
 DF AUTOMATED NEUTROPHILS 66 43 - 78 % LYMPHOCYTES 26 13 - 44 % MONOCYTES 7 4.0 - 12.0 % EOSINOPHILS 1 0.5 - 7.8 % BASOPHILS 0 0.0 - 2.0 % IMMATURE GRANULOCYTES 0 0.0 - 5.0 %  
 ABS. NEUTROPHILS 5.7 1.7 - 8.2 K/UL  
 ABS. LYMPHOCYTES 2.3 0.5 - 4.6 K/UL  
 ABS. MONOCYTES 0.6 0.1 - 1.3 K/UL  
 ABS. EOSINOPHILS 0.1 0.0 - 0.8 K/UL  
 ABS. BASOPHILS 0.0 0.0 - 0.2 K/UL  
 ABS. IMM. GRANS. 0.0 0.0 - 0.5 K/UL METABOLIC PANEL, COMPREHENSIVE  Collection Time: 18 3:03 PM  
Result Value Ref Range Sodium 138 136 - 145 mmol/L Potassium 5.1 3.5 - 5.1 mmol/L Chloride 104 98 - 107 mmol/L  
 CO2 28 21 - 32 mmol/L Anion gap 6 (L) 7 - 16 mmol/L Glucose 95 65 - 100 mg/dL BUN 9 6 - 23 MG/DL Creatinine 0.92 0.8 - 1.5 MG/DL  
 GFR est AA >60 >60 ml/min/1.73m2 GFR est non-AA >60 >60 ml/min/1.73m2 Calcium 8.9 8.3 - 10.4 MG/DL Bilirubin, total 0.9 0.2 - 1.1 MG/DL  
 ALT (SGPT) 58 12 - 65 U/L  
 AST (SGOT) 47 (H) 15 - 37 U/L Alk. phosphatase 72 50 - 136 U/L Protein, total 8.1 6.3 - 8.2 g/dL Albumin 3.6 3.5 - 5.0 g/dL Globulin 4.5 (H) 2.3 - 3.5 g/dL A-G Ratio 0.8 (L) 1.2 - 3.5 All Micro Results Procedure Component Value Units Date/Time CULTURE, BLOOD [[id number]] Collected:  07/27/18 1810 Order Status:  Completed Specimen:  Blood from Blood Updated:  07/27/18 1816 CULTURE, BLOOD [[id number]] Collected:  07/27/18 1754 Order Status:  Completed Specimen:  Blood from Blood Updated:  07/27/18 1801 Imaging /Procedures /Studies: 
I personally reviewed all labs, imaging, and other studies this admission: No results found. Assessment and Plan: Active Hospital Problems Diagnosis Date Noted  Cellulitis of left upper extremity 07/27/2018  Severe obesity (BMI 35.0-39.9) ([facility] [address] 07/16/2018 PLAN 
- continue Cefepime and Add IV Clindamycin. 
- get blood cultures - IV hydration 
- consider MRI L hand- consult ENT - appreciate the help 
- educated regarding obesity DVT ppx: SCDs Code status: Full CODE  
Estimated LOS:  2-3 days Risk assessment: high Plan of care discussed with: patient and his mother at bedside in ED. Care team. 
Mr. Beatriz Gonzalez will need at least 2 midnights of admission. Tariq Bush MD 
07/27/18

## 2018-07-28 ENCOUNTER — APPOINTMENT (OUTPATIENT)
Dept: MRI IMAGING | Age: 23
DRG: 603 | End: 2018-07-28
Attending: ORTHOPAEDIC SURGERY
Payer: OTHER MISCELLANEOUS

## 2018-07-28 LAB
ALBUMIN SERPL-MCNC: 3.3 G/DL (ref 3.5–5)
ALBUMIN/GLOB SERPL: 0.9 {RATIO} (ref 1.2–3.5)
ALP SERPL-CCNC: 68 U/L (ref 50–136)
ALT SERPL-CCNC: 44 U/L (ref 12–65)
ANION GAP SERPL CALC-SCNC: 6 MMOL/L (ref 7–16)
AST SERPL-CCNC: 18 U/L (ref 15–37)
BASOPHILS # BLD: 0 K/UL (ref 0–0.2)
BASOPHILS NFR BLD: 0 % (ref 0–2)
BILIRUB SERPL-MCNC: 0.8 MG/DL (ref 0.2–1.1)
BUN SERPL-MCNC: 9 MG/DL (ref 6–23)
CALCIUM SERPL-MCNC: 8.4 MG/DL (ref 8.3–10.4)
CHLORIDE SERPL-SCNC: 105 MMOL/L (ref 98–107)
CO2 SERPL-SCNC: 29 MMOL/L (ref 21–32)
CREAT SERPL-MCNC: 0.95 MG/DL (ref 0.8–1.5)
DIFFERENTIAL METHOD BLD: ABNORMAL
EOSINOPHIL # BLD: 0.2 K/UL (ref 0–0.8)
EOSINOPHIL NFR BLD: 2 % (ref 0.5–7.8)
ERYTHROCYTE [DISTWIDTH] IN BLOOD BY AUTOMATED COUNT: 12.4 % (ref 11.9–14.6)
GLOBULIN SER CALC-MCNC: 3.6 G/DL (ref 2.3–3.5)
GLUCOSE SERPL-MCNC: 106 MG/DL (ref 65–100)
HCT VFR BLD AUTO: 43.6 % (ref 41.1–50.3)
HGB BLD-MCNC: 15.2 G/DL (ref 13.6–17.2)
IMM GRANULOCYTES # BLD: 0 K/UL (ref 0–0.5)
IMM GRANULOCYTES NFR BLD AUTO: 0 % (ref 0–5)
LYMPHOCYTES # BLD: 2.8 K/UL (ref 0.5–4.6)
LYMPHOCYTES NFR BLD: 30 % (ref 13–44)
MCH RBC QN AUTO: 29.8 PG (ref 26.1–32.9)
MCHC RBC AUTO-ENTMCNC: 34.9 G/DL (ref 31.4–35)
MCV RBC AUTO: 85.5 FL (ref 79.6–97.8)
MONOCYTES # BLD: 0.9 K/UL (ref 0.1–1.3)
MONOCYTES NFR BLD: 9 % (ref 4–12)
NEUTS SEG # BLD: 5.5 K/UL (ref 1.7–8.2)
NEUTS SEG NFR BLD: 59 % (ref 43–78)
PLATELET # BLD AUTO: 228 K/UL (ref 150–450)
PMV BLD AUTO: 9.5 FL (ref 10.8–14.1)
POTASSIUM SERPL-SCNC: 3.6 MMOL/L (ref 3.5–5.1)
PROT SERPL-MCNC: 6.9 G/DL (ref 6.3–8.2)
RBC # BLD AUTO: 5.1 M/UL (ref 4.23–5.67)
SODIUM SERPL-SCNC: 140 MMOL/L (ref 136–145)
WBC # BLD AUTO: 9.3 K/UL (ref 4.3–11.1)

## 2018-07-28 PROCEDURE — 74011250637 HC RX REV CODE- 250/637: Performed by: FAMILY MEDICINE

## 2018-07-28 PROCEDURE — 65660000000 HC RM CCU STEPDOWN

## 2018-07-28 PROCEDURE — 80053 COMPREHEN METABOLIC PANEL: CPT | Performed by: HOSPITALIST

## 2018-07-28 PROCEDURE — 74011000258 HC RX REV CODE- 258: Performed by: HOSPITALIST

## 2018-07-28 PROCEDURE — 74011250636 HC RX REV CODE- 250/636: Performed by: PHYSICIAN ASSISTANT

## 2018-07-28 PROCEDURE — 77030020263 HC SOL INJ SOD CL0.9% LFCR 1000ML

## 2018-07-28 PROCEDURE — 74011250636 HC RX REV CODE- 250/636: Performed by: HOSPITALIST

## 2018-07-28 PROCEDURE — 73220 MRI UPPR EXTREMITY W/O&W/DYE: CPT

## 2018-07-28 PROCEDURE — 74011250636 HC RX REV CODE- 250/636: Performed by: INTERNAL MEDICINE

## 2018-07-28 PROCEDURE — 36415 COLL VENOUS BLD VENIPUNCTURE: CPT | Performed by: HOSPITALIST

## 2018-07-28 PROCEDURE — 74011250637 HC RX REV CODE- 250/637: Performed by: INTERNAL MEDICINE

## 2018-07-28 PROCEDURE — 74011250636 HC RX REV CODE- 250/636: Performed by: FAMILY MEDICINE

## 2018-07-28 PROCEDURE — A9575 INJ GADOTERATE MEGLUMI 0.1ML: HCPCS | Performed by: HOSPITALIST

## 2018-07-28 PROCEDURE — 85025 COMPLETE CBC W/AUTO DIFF WBC: CPT | Performed by: HOSPITALIST

## 2018-07-28 PROCEDURE — 74011000250 HC RX REV CODE- 250: Performed by: HOSPITALIST

## 2018-07-28 PROCEDURE — 74011250637 HC RX REV CODE- 250/637: Performed by: HOSPITALIST

## 2018-07-28 PROCEDURE — 94762 N-INVAS EAR/PLS OXIMTRY CONT: CPT

## 2018-07-28 RX ORDER — KETOROLAC TROMETHAMINE 15 MG/ML
15 INJECTION, SOLUTION INTRAMUSCULAR; INTRAVENOUS
Status: DISCONTINUED | OUTPATIENT
Start: 2018-07-28 | End: 2018-07-30 | Stop reason: HOSPADM

## 2018-07-28 RX ORDER — GADOTERATE MEGLUMINE 376.9 MG/ML
5 INJECTION INTRAVENOUS
Status: COMPLETED | OUTPATIENT
Start: 2018-07-28 | End: 2018-07-28

## 2018-07-28 RX ORDER — SODIUM CHLORIDE 0.9 % (FLUSH) 0.9 %
10 SYRINGE (ML) INJECTION
Status: COMPLETED | OUTPATIENT
Start: 2018-07-28 | End: 2018-07-28

## 2018-07-28 RX ORDER — DIPHENHYDRAMINE HCL 25 MG
25 CAPSULE ORAL
Status: DISCONTINUED | OUTPATIENT
Start: 2018-07-28 | End: 2018-07-28

## 2018-07-28 RX ORDER — MORPHINE SULFATE 2 MG/ML
2 INJECTION, SOLUTION INTRAMUSCULAR; INTRAVENOUS
Status: DISCONTINUED | OUTPATIENT
Start: 2018-07-28 | End: 2018-07-29

## 2018-07-28 RX ORDER — ONDANSETRON 2 MG/ML
4 INJECTION INTRAMUSCULAR; INTRAVENOUS
Status: DISCONTINUED | OUTPATIENT
Start: 2018-07-28 | End: 2018-07-30 | Stop reason: HOSPADM

## 2018-07-28 RX ORDER — DIPHENHYDRAMINE HCL 25 MG
25 CAPSULE ORAL
Status: DISCONTINUED | OUTPATIENT
Start: 2018-07-28 | End: 2018-07-30 | Stop reason: HOSPADM

## 2018-07-28 RX ORDER — GADOTERATE MEGLUMINE 376.9 MG/ML
25 INJECTION INTRAVENOUS
Status: COMPLETED | OUTPATIENT
Start: 2018-07-28 | End: 2018-07-28

## 2018-07-28 RX ADMIN — MORPHINE SULFATE 6 MG: 10 INJECTION INTRAVENOUS at 08:17

## 2018-07-28 RX ADMIN — MORPHINE SULFATE 6 MG: 10 INJECTION INTRAVENOUS at 03:48

## 2018-07-28 RX ADMIN — SODIUM CHLORIDE 600 MG: 900 INJECTION, SOLUTION INTRAVENOUS at 03:49

## 2018-07-28 RX ADMIN — SODIUM CHLORIDE 600 MG: 900 INJECTION, SOLUTION INTRAVENOUS at 12:27

## 2018-07-28 RX ADMIN — SODIUM CHLORIDE 100 ML/HR: 900 INJECTION, SOLUTION INTRAVENOUS at 05:59

## 2018-07-28 RX ADMIN — MORPHINE SULFATE 6 MG: 10 INJECTION INTRAVENOUS at 12:26

## 2018-07-28 RX ADMIN — DIPHENHYDRAMINE HYDROCHLORIDE 25 MG: 25 CAPSULE ORAL at 01:52

## 2018-07-28 RX ADMIN — HYDROCODONE BITARTRATE AND ACETAMINOPHEN 1 TABLET: 5; 325 TABLET ORAL at 05:59

## 2018-07-28 RX ADMIN — ONDANSETRON 4 MG: 2 INJECTION, SOLUTION INTRAMUSCULAR; INTRAVENOUS at 01:52

## 2018-07-28 RX ADMIN — DIPHENHYDRAMINE HYDROCHLORIDE 25 MG: 25 CAPSULE ORAL at 13:53

## 2018-07-28 RX ADMIN — KETOROLAC TROMETHAMINE 15 MG: 15 INJECTION, SOLUTION INTRAMUSCULAR; INTRAVENOUS at 21:28

## 2018-07-28 RX ADMIN — Medication 10 ML: at 11:28

## 2018-07-28 RX ADMIN — GADOTERATE MEGLUMINE 5 ML: 376.9 INJECTION INTRAVENOUS at 11:24

## 2018-07-28 RX ADMIN — KETOROLAC TROMETHAMINE 15 MG: 15 INJECTION, SOLUTION INTRAMUSCULAR; INTRAVENOUS at 14:54

## 2018-07-28 RX ADMIN — SODIUM CHLORIDE 1 G: 900 INJECTION, SOLUTION INTRAVENOUS at 17:51

## 2018-07-28 RX ADMIN — SODIUM CHLORIDE 1 G: 900 INJECTION, SOLUTION INTRAVENOUS at 05:59

## 2018-07-28 RX ADMIN — SODIUM CHLORIDE 600 MG: 900 INJECTION, SOLUTION INTRAVENOUS at 19:58

## 2018-07-28 RX ADMIN — GADOTERATE MEGLUMINE 20 ML: 376.9 INJECTION INTRAVENOUS at 11:20

## 2018-07-28 RX ADMIN — Medication 10 ML: at 22:00

## 2018-07-28 RX ADMIN — MORPHINE SULFATE 4 MG: 10 INJECTION INTRAVENOUS at 00:16

## 2018-07-28 RX ADMIN — HYDROCODONE BITARTRATE AND ACETAMINOPHEN 1 TABLET: 5; 325 TABLET ORAL at 10:26

## 2018-07-28 RX ADMIN — MORPHINE SULFATE 2 MG: 2 INJECTION, SOLUTION INTRAMUSCULAR; INTRAVENOUS at 19:58

## 2018-07-28 RX ADMIN — DIPHENHYDRAMINE HYDROCHLORIDE 25 MG: 25 CAPSULE ORAL at 08:42

## 2018-07-28 RX ADMIN — HYDROCODONE BITARTRATE AND ACETAMINOPHEN 1 TABLET: 5; 325 TABLET ORAL at 17:51

## 2018-07-28 NOTE — CONSULTS
[address]    Fabiola Carbone  MR#: [id number]  : 1995  ACCOUNT #: [de-identified]   DATE OF SERVICE: 2018    Whitley Pena PA-C, dictating for Dr. Karan Woodruff. CONSULTING SERVICE:  Hospitalist.    REASON FOR CONSULTATION:  Left hand cellulitis. CHIEF COMPLAINT:  Left hand pain and swelling. HISTORY OF PRESENT ILLNESS:  The patient is a 20-year-old white male who sustained a cat bite to the left hand approximately 2 days ago and has noted progressively worsening redness, swelling and pain over the past 48 hours. Patient was initially evaluated by his PCP and placed on p.o. Augmentin; however, symptoms continued to worsen and he therefore [facility] [place] [facility] where he was subsequently admitted by the hospitalist service and started on IV cefepime and clindamycin for treatment of severe left hand cellulitis. Orthopedic service is being consulted at this time for further evaluation and management. Patient reports that the left hand and forearm pain is mild to moderate severity. He denies any tingling or numbness in left upper extremity. He reports mild decreased sensation over the dorsum of the left hand. He also reports mild serous drainage from puncture wound between left index and long fingers. He reports mild improvement in swelling throughout digits of left hand since being admitted. Patient denies any subjective fever or chills, denies any other pertinent past medical history, no other new complaints. REVIEW OF SYSTEMS:  As per HPI, otherwise 10-point review of systems is negative. PAST MEDICAL HISTORY:  No pertinent past medical history reported. PAST SURGICAL HISTORY:  No pertinent past surgical history reported. FAMILY HISTORY:  Noncontributory. SOCIAL HISTORY:  Denies tobacco, alcohol, or illicit drug use.     PHYSICAL EXAMINATION:  VITAL SIGNS:  [facility], blood pressure is 140/80, pulse rate 63, respiratory rate 18, O2 saturation 100%. HEENT:  Head is normocephalic, atraumatic. Pupils are equally round and reactive to light. Extraocular motion intact. CARDIOVASCULAR:  No clubbing or cyanosis present. 1+ radial pulse present in the left upper extremity with brisk capillary refill present in all 5 digits of the left hand. MUSCULOSKELETAL:  Examination of left upper extremity reveals a puncture wound measuring approximately 1 mm over dorsal aspect of left hand between left index and long finger MCP joints. There is mildly expressible serous fluid present. No appreciable fluctuance. There is mild erythema extending over dorsal aspect of left hand, extending proximally to prison point of the left forearm. There is diffuse and moderate soft tissue edema extending from all 5 fingers into dorsal aspect of left hand and into dorsal aspect of left forearm. The patient is able to actively dorsiflex and volar flex the left wrist, which is mildly diminished secondary to pain and swelling. Patient is able to actively extend fingers and nearly make a closed fist, which is limited secondary to soft tissue swelling and pain. Patient demonstrates good thumb opposition. There is diffuse tenderness to palpation present throughout the dorsal aspect of the left hand and forearm, extending up to midway point of the forearm dorsally. NEUROLOGIC:  Alert and oriented x3. Cranial nerves II-XII are intact. Light touch sensation and motor function intact throughout median, ulnar, and radial nerve distributions of the left upper extremity. SKIN:  Puncture wound as noted above, otherwise warm, dry, normal turgor. PSYCHIATRIC:  Appropriate mood and affect. DIAGNOSTIC DATA:  Include CBC from 07/27/2018, hemoglobin 16.6, hematocrit 46.4, white blood cell count 8.8, platelets 820,443.   BMP:  Sodium 130, potassium 5.1, chloride 104, CO2 20, BUN 9, creatinine 0.92, glucose 95. Left hand x-rays, 3 views, obtained on 07/27/2018 revealed soft tissue swelling, otherwise no acute bony abnormality present per radiologist.    IMPRESSION:  Left hand puncture wound with associated left hand and forearm cellulitis status post cat bite. PLAN:  The patient was seen and examined with Dr. Tristan Spangler this evening. We will continue empiric IV antibiotic treatment in the form of clindamycin and cefepime. Patient was instructed to keep the left upper extremity elevated at the level of the heart. He may apply cold therapy to help alleviate soft tissue swelling. History and exam findings do not suggest the presence of compartment syndrome, nor is it felt that any orthopedic surgical intervention is warranted at this time. We will plan to reevaluate patient in the morning and obtain MRI for further evaluation of possible abscess if symptoms do not continue to improve. Continue acute pain control for now. We appreciate being allowed to participate in the care of this patient. We will continue to follow along with you.       TJ Mccoy / SHILPI  D: 07/27/2018 20:42     T: 07/27/2018 21:48  JOB #: [id number]

## 2018-07-28 NOTE — PROGRESS NOTES
Hospitalist Progress Note Admit Date:  2018  4:52 PM  
Name:  Dena Precise Age:  25 y.o. 
:  1995 MRN:  [id number] PCP:  Karri Cochran DO Treatment Team: Attending Provider: Summer Hernandez MD; Consulting Provider: TJ Rodrigues; Utilization Review: Neville Chan Subjective:  
CC: Cat bite left hand Pt is a 26 yo male with no significant PMH. 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 Pt had been on Augmentin afterwards but the symptoms worsened. He had swelling and pain up to the forearm. He reports some numbness on the dorsal surface of his hand. HE describes the pain as throbbing. His tetanus is up to date and he reports that he has had the rabies vaccine. Pt on IV cefepime and clindamycin. WBC 9.3, blood cultures in process. To have an MRI this morning. Objective:  
Patient Vitals for the past 24 hrs: 
 Temp Pulse Resp BP SpO2  
18 0736 98.3 °F (36.8 °C) 76 17 123/68 97 %  
18 0400 99 °F (37.2 °C) 87 18 138/83 95 %  
18 2306 99 °F (37.2 °C) 75 18 144/83 99 % 18 1847 98.4 °F (36.9 °C) 63 18 140/80 100 % 18 1454 98.6 °F (37 °C) 79 18 (!) 130/92 96 % Oxygen Therapy O2 Sat (%): 97 % (18 0736) O2 Device: Room air (18) Intake/Output Summary (Last 24 hours) at 18 0901 Last data filed at 18 9458 Gross per 24 hour Intake             1180 ml Output              500 ml Net              680 ml General:    Well nourished. Awake and alert. Head:  Normocephalic, atraumatic Eyes:  Extraocular movements intact, normal sclera Neck:  Supple, no lymphadenopathy or JVD 
CV:   RRR. No  Murmurs, clicks, or gallops Lungs:   Unlabored, no cyanosis Abdomen:   Soft, nondistended, nontender. Extremities: Warm and dry. Left hand and forearm swollen Skin:     No rashes or jaundice. Neuro:  No gross focal deficits Psych:  Mood and affect appropriate Data Review: 
I have reviewed all labs, meds, telemetry events, and studies from the last 24 hours. Recent Results (from the past 24 hour(s)) CBC WITH AUTOMATED DIFF Collection Time: 07/27/18  3:03 PM  
Result Value Ref Range WBC 8.8 4.3 - 11.1 K/uL  
 RBC 5.50 4.23 - 5.67 M/uL  
 HGB 16.6 13.6 - 17.2 g/dL HCT 46.4 41.1 - 50.3 % MCV 84.4 79.6 - 97.8 FL  
 MCH 30.2 26.1 - 32.9 PG  
 MCHC 35.8 (H) 31.4 - 35.0 g/dL  
 RDW 12.5 11.9 - 14.6 % PLATELET 965 392 - 440 K/uL MPV 9.6 (L) 10.8 - 14.1 FL  
 DF AUTOMATED NEUTROPHILS 66 43 - 78 % LYMPHOCYTES 26 13 - 44 % MONOCYTES 7 4.0 - 12.0 % EOSINOPHILS 1 0.5 - 7.8 % BASOPHILS 0 0.0 - 2.0 % IMMATURE GRANULOCYTES 0 0.0 - 5.0 %  
 ABS. NEUTROPHILS 5.7 1.7 - 8.2 K/UL  
 ABS. LYMPHOCYTES 2.3 0.5 - 4.6 K/UL  
 ABS. MONOCYTES 0.6 0.1 - 1.3 K/UL  
 ABS. EOSINOPHILS 0.1 0.0 - 0.8 K/UL  
 ABS. BASOPHILS 0.0 0.0 - 0.2 K/UL  
 ABS. IMM. GRANS. 0.0 0.0 - 0.5 K/UL METABOLIC PANEL, COMPREHENSIVE Collection Time: 07/27/18  3:03 PM  
Result Value Ref Range Sodium 138 136 - 145 mmol/L Potassium 5.1 3.5 - 5.1 mmol/L Chloride 104 98 - 107 mmol/L  
 CO2 28 21 - 32 mmol/L Anion gap 6 (L) 7 - 16 mmol/L Glucose 95 65 - 100 mg/dL BUN 9 6 - 23 MG/DL Creatinine 0.92 0.8 - 1.5 MG/DL  
 GFR est AA >60 >60 ml/min/1.73m2 GFR est non-AA >60 >60 ml/min/1.73m2 Calcium 8.9 8.3 - 10.4 MG/DL Bilirubin, total 0.9 0.2 - 1.1 MG/DL  
 ALT (SGPT) 58 12 - 65 U/L  
 AST (SGOT) 47 (H) 15 - 37 U/L Alk. phosphatase 72 50 - 136 U/L Protein, total 8.1 6.3 - 8.2 g/dL Albumin 3.6 3.5 - 5.0 g/dL Globulin 4.5 (H) 2.3 - 3.5 g/dL A-G Ratio 0.8 (L) 1.2 - 3.5 PROTHROMBIN TIME + INR Collection Time: 07/27/18  5:54 PM  
Result Value Ref Range Prothrombin time 13.4 11.5 - 14.5 sec INR 1.1 PTT Collection Time: 07/27/18  5:54 PM  
Result Value Ref Range aPTT 25.1 23.2 - 35.3 SEC  
LACTIC ACID  Collection Time: 07/27/18  6:11 PM  
Result Value Ref Range Lactic acid 0.9 0.4 - 2.0 MMOL/L  
METABOLIC PANEL, COMPREHENSIVE Collection Time: 07/28/18  3:48 AM  
Result Value Ref Range Sodium 140 136 - 145 mmol/L Potassium 3.6 3.5 - 5.1 mmol/L Chloride 105 98 - 107 mmol/L  
 CO2 29 21 - 32 mmol/L Anion gap 6 (L) 7 - 16 mmol/L Glucose 106 (H) 65 - 100 mg/dL BUN 9 6 - 23 MG/DL Creatinine 0.95 0.8 - 1.5 MG/DL  
 GFR est AA >60 >60 ml/min/1.73m2 GFR est non-AA >60 >60 ml/min/1.73m2 Calcium 8.4 8.3 - 10.4 MG/DL Bilirubin, total 0.8 0.2 - 1.1 MG/DL  
 ALT (SGPT) 44 12 - 65 U/L  
 AST (SGOT) 18 15 - 37 U/L Alk. phosphatase 68 50 - 136 U/L Protein, total 6.9 6.3 - 8.2 g/dL Albumin 3.3 (L) 3.5 - 5.0 g/dL Globulin 3.6 (H) 2.3 - 3.5 g/dL A-G Ratio 0.9 (L) 1.2 - 3.5    
CBC WITH AUTOMATED DIFF Collection Time: 07/28/18  3:48 AM  
Result Value Ref Range WBC 9.3 4.3 - 11.1 K/uL  
 RBC 5.10 4.23 - 5.67 M/uL  
 HGB 15.2 13.6 - 17.2 g/dL HCT 43.6 41.1 - 50.3 % MCV 85.5 79.6 - 97.8 FL  
 MCH 29.8 26.1 - 32.9 PG  
 MCHC 34.9 31.4 - 35.0 g/dL  
 RDW 12.4 11.9 - 14.6 % PLATELET 335 869 - 466 K/uL MPV 9.5 (L) 10.8 - 14.1 FL  
 DF AUTOMATED NEUTROPHILS 59 43 - 78 % LYMPHOCYTES 30 13 - 44 % MONOCYTES 9 4.0 - 12.0 % EOSINOPHILS 2 0.5 - 7.8 % BASOPHILS 0 0.0 - 2.0 % IMMATURE GRANULOCYTES 0 0.0 - 5.0 %  
 ABS. NEUTROPHILS 5.5 1.7 - 8.2 K/UL  
 ABS. LYMPHOCYTES 2.8 0.5 - 4.6 K/UL  
 ABS. MONOCYTES 0.9 0.1 - 1.3 K/UL  
 ABS. EOSINOPHILS 0.2 0.0 - 0.8 K/UL  
 ABS. BASOPHILS 0.0 0.0 - 0.2 K/UL  
 ABS. IMM. GRANS. 0.0 0.0 - 0.5 K/UL All Micro Results Procedure Component Value Units Date/Time CULTURE, BLOOD [[id number]] Collected:  07/27/18 1754 Order Status:  Completed Specimen:  Blood from Blood Updated:  07/27/18 1922 CULTURE, BLOOD [[id number]] Collected:  07/27/18 1810  Order Status:  Completed Specimen:  Blood from Blood Updated:  07/27/18 1921 Current Meds: 
Current Facility-Administered Medications Medication Dose Route Frequency  ondansetron (ZOFRAN) injection 4 mg  4 mg IntraVENous Q6H PRN  
 diphenhydrAMINE (BENADRYL) capsule 25 mg  25 mg Oral Q6H PRN  
 cefepime (MAXIPIME) 1 g in 0.9% sodium chloride (MBP/ADV) 50 mL ADV  1 g IntraVENous Q12H  
 0.9% sodium chloride infusion  100 mL/hr IntraVENous CONTINUOUS  
 HYDROcodone-acetaminophen (NORCO) 5-325 mg per tablet 1 Tab  1 Tab Oral Q4H PRN  
 sodium chloride (NS) flush 5-10 mL  5-10 mL IntraVENous Q8H  
 sodium chloride (NS) flush 5-10 mL  5-10 mL IntraVENous PRN  
 acetaminophen (TYLENOL) tablet 650 mg  650 mg Oral Q6H PRN  
 clindamycin phosphate (CLEOCIN) 600 mg, ADDaptor 1 Device in 0.9% sodium chloride (MBP/ADV) 100 mL ADV  600 mg IntraVENous Q8H  
 morphine 10 mg/mL injection 4-6 mg  4-6 mg IntraVENous Q4H PRN Diet: DIET NPO Other Studies (last 24 hours): 
Xr Hand Lt Min 3 V Result Date: 7/27/2018 Left hand INDICATION: Left hand pain, recent cat bite Three views of the left hand were obtained. FINDINGS: There is diffuse soft tissue swelling. No foreign body is seen. There is no evidence of fracture or dislocation. There are no bony erosions. There is no significant joint space narrowing. IMPRESSION:  Soft tissue swelling. No bony abnormality. Assessment and Plan:  
 
Hospital Problems as of 7/28/2018  Date Reviewed: 7/15/2018 Codes Class Noted - Resolved POA * (Principal)Cellulitis of left upper extremity ICD-10-CM: L03.114 
ICD-9-CM: 682.3  7/27/2018 - Present Yes Severe obesity (BMI 35.0-39.9) ([facility] [address] ICD-10-CM: E66.01 
ICD-9-CM: 278.01  7/16/2018 - Present Yes A/P:   
1. Cellulitis left upper extremity Continue IV cefepime and clindamycin Ortho consult Pain management Supportive care 2. Obesity Education DC planning/Dispo:  Home DVT ppx:  SCDs Case reviewed with supervising physician - Signed: 
JHOANA Melton

## 2018-07-28 NOTE — PROGRESS NOTES
END OF SHIFT NOTE: 
 
INTAKE/OUTPUT 
07/27 0701 - 07/28 0700 In: 778 [I.V.:778] Out: 500 [Urine:500] Voiding: YES Catheter: NO 
Drain:   
 
 
 
 
 
Flatus: Patient does have flatus present. Stool:  1 occurrences. Characteristics: 
  
 
Emesis: 0 occurrences. Characteristics: VITAL SIGNS Patient Vitals for the past 12 hrs: 
 Temp Pulse Resp BP SpO2  
07/28/18 1506 98.1 °F (36.7 °C) 66 14 131/83 96 %  
07/28/18 1143 98.1 °F (36.7 °C) 66 18 128/80 96 %  
07/28/18 0736 98.3 °F (36.8 °C) 76 17 123/68 97 % Pain Assessment Pain Intensity 1: 9 (07/28/18 1230) Pain Location 1: Hand Pain Intervention(s) 1: Medication (see MAR) Patient Stated Pain Goal: 0 Ambulating No 
 
Shift report given to oncoming nurse at the bedside. Has better pain control with toradol. Right hand remains edematous, warm and painful.  
 
Adriana Kimble RN

## 2018-07-29 LAB
ANION GAP SERPL CALC-SCNC: 8 MMOL/L (ref 7–16)
BASOPHILS # BLD: 0 K/UL (ref 0–0.2)
BASOPHILS NFR BLD: 0 % (ref 0–2)
BUN SERPL-MCNC: 10 MG/DL (ref 6–23)
CALCIUM SERPL-MCNC: 8.8 MG/DL (ref 8.3–10.4)
CHLORIDE SERPL-SCNC: 106 MMOL/L (ref 98–107)
CO2 SERPL-SCNC: 27 MMOL/L (ref 21–32)
CREAT SERPL-MCNC: 0.88 MG/DL (ref 0.8–1.5)
DIFFERENTIAL METHOD BLD: ABNORMAL
EOSINOPHIL # BLD: 0.2 K/UL (ref 0–0.8)
EOSINOPHIL NFR BLD: 3 % (ref 0.5–7.8)
ERYTHROCYTE [DISTWIDTH] IN BLOOD BY AUTOMATED COUNT: 12.3 % (ref 11.9–14.6)
GLUCOSE SERPL-MCNC: 104 MG/DL (ref 65–100)
HCT VFR BLD AUTO: 42.4 % (ref 41.1–50.3)
HGB BLD-MCNC: 14.6 G/DL (ref 13.6–17.2)
IMM GRANULOCYTES # BLD: 0 K/UL (ref 0–0.5)
IMM GRANULOCYTES NFR BLD AUTO: 0 % (ref 0–5)
LYMPHOCYTES # BLD: 2.4 K/UL (ref 0.5–4.6)
LYMPHOCYTES NFR BLD: 37 % (ref 13–44)
MCH RBC QN AUTO: 29.5 PG (ref 26.1–32.9)
MCHC RBC AUTO-ENTMCNC: 34.4 G/DL (ref 31.4–35)
MCV RBC AUTO: 85.7 FL (ref 79.6–97.8)
MONOCYTES # BLD: 0.7 K/UL (ref 0.1–1.3)
MONOCYTES NFR BLD: 11 % (ref 4–12)
NEUTS SEG # BLD: 3 K/UL (ref 1.7–8.2)
NEUTS SEG NFR BLD: 49 % (ref 43–78)
PLATELET # BLD AUTO: 238 K/UL (ref 150–450)
PMV BLD AUTO: 10 FL (ref 10.8–14.1)
POTASSIUM SERPL-SCNC: 3.9 MMOL/L (ref 3.5–5.1)
RBC # BLD AUTO: 4.95 M/UL (ref 4.23–5.67)
SODIUM SERPL-SCNC: 141 MMOL/L (ref 136–145)
WBC # BLD AUTO: 6.3 K/UL (ref 4.3–11.1)

## 2018-07-29 PROCEDURE — 65660000000 HC RM CCU STEPDOWN

## 2018-07-29 PROCEDURE — 36415 COLL VENOUS BLD VENIPUNCTURE: CPT | Performed by: INTERNAL MEDICINE

## 2018-07-29 PROCEDURE — 74011000250 HC RX REV CODE- 250: Performed by: HOSPITALIST

## 2018-07-29 PROCEDURE — 74011250637 HC RX REV CODE- 250/637: Performed by: INTERNAL MEDICINE

## 2018-07-29 PROCEDURE — 74011250637 HC RX REV CODE- 250/637: Performed by: HOSPITALIST

## 2018-07-29 PROCEDURE — 74011000258 HC RX REV CODE- 258: Performed by: HOSPITALIST

## 2018-07-29 PROCEDURE — 74011250636 HC RX REV CODE- 250/636: Performed by: FAMILY MEDICINE

## 2018-07-29 PROCEDURE — 85025 COMPLETE CBC W/AUTO DIFF WBC: CPT | Performed by: INTERNAL MEDICINE

## 2018-07-29 PROCEDURE — 74011250636 HC RX REV CODE- 250/636: Performed by: INTERNAL MEDICINE

## 2018-07-29 PROCEDURE — 74011250636 HC RX REV CODE- 250/636: Performed by: HOSPITALIST

## 2018-07-29 PROCEDURE — 80048 BASIC METABOLIC PNL TOTAL CA: CPT | Performed by: INTERNAL MEDICINE

## 2018-07-29 PROCEDURE — 94762 N-INVAS EAR/PLS OXIMTRY CONT: CPT

## 2018-07-29 RX ORDER — MORPHINE SULFATE 2 MG/ML
2 INJECTION, SOLUTION INTRAMUSCULAR; INTRAVENOUS
Status: DISCONTINUED | OUTPATIENT
Start: 2018-07-29 | End: 2018-07-30 | Stop reason: HOSPADM

## 2018-07-29 RX ADMIN — HYDROCODONE BITARTRATE AND ACETAMINOPHEN 1 TABLET: 5; 325 TABLET ORAL at 07:39

## 2018-07-29 RX ADMIN — Medication 10 ML: at 15:15

## 2018-07-29 RX ADMIN — SODIUM CHLORIDE 600 MG: 900 INJECTION, SOLUTION INTRAVENOUS at 11:59

## 2018-07-29 RX ADMIN — MORPHINE SULFATE 2 MG: 2 INJECTION, SOLUTION INTRAMUSCULAR; INTRAVENOUS at 15:08

## 2018-07-29 RX ADMIN — SODIUM CHLORIDE 1 G: 900 INJECTION, SOLUTION INTRAVENOUS at 05:51

## 2018-07-29 RX ADMIN — KETOROLAC TROMETHAMINE 15 MG: 15 INJECTION, SOLUTION INTRAMUSCULAR; INTRAVENOUS at 19:23

## 2018-07-29 RX ADMIN — Medication 10 ML: at 22:00

## 2018-07-29 RX ADMIN — KETOROLAC TROMETHAMINE 15 MG: 15 INJECTION, SOLUTION INTRAMUSCULAR; INTRAVENOUS at 05:53

## 2018-07-29 RX ADMIN — KETOROLAC TROMETHAMINE 15 MG: 15 INJECTION, SOLUTION INTRAMUSCULAR; INTRAVENOUS at 11:57

## 2018-07-29 RX ADMIN — SODIUM CHLORIDE 600 MG: 900 INJECTION, SOLUTION INTRAVENOUS at 03:19

## 2018-07-29 RX ADMIN — HYDROCODONE BITARTRATE AND ACETAMINOPHEN 1 TABLET: 5; 325 TABLET ORAL at 23:20

## 2018-07-29 RX ADMIN — SODIUM CHLORIDE 600 MG: 900 INJECTION, SOLUTION INTRAVENOUS at 19:23

## 2018-07-29 RX ADMIN — DIPHENHYDRAMINE HYDROCHLORIDE 25 MG: 25 CAPSULE ORAL at 15:13

## 2018-07-29 RX ADMIN — SODIUM CHLORIDE 1 G: 900 INJECTION, SOLUTION INTRAVENOUS at 17:42

## 2018-07-29 RX ADMIN — MORPHINE SULFATE 2 MG: 2 INJECTION, SOLUTION INTRAMUSCULAR; INTRAVENOUS at 04:22

## 2018-07-29 RX ADMIN — Medication 10 ML: at 06:00

## 2018-07-29 RX ADMIN — ONDANSETRON 4 MG: 2 INJECTION, SOLUTION INTRAMUSCULAR; INTRAVENOUS at 03:41

## 2018-07-29 RX ADMIN — HYDROCODONE BITARTRATE AND ACETAMINOPHEN 1 TABLET: 5; 325 TABLET ORAL at 03:18

## 2018-07-29 NOTE — PROGRESS NOTES
2018 Post Op day: * No surgery found * Admit Date: 2018 Admit Diagnosis: Cellulitis and abscess of hand Subjective: Patient stable. No acute events. Objective:  
Visit Vitals  /78  Pulse (!) 58  Temp 98 °F (36.7 °C)  Resp 18  Ht 5' 10\" (1.778 m)  Wt 122.5 kg (270 lb)  SpO2 98%  BMI 38.74 kg/m2 Temp (24hrs), Av.4 °F (36.9 °C), Min:98 °F (36.7 °C), Max:99.4 °F (37.4 °C) Lab Results Component Value Date/Time HGB 14.6 2018 04:40 AM  
 
Extremity Exam 
Swelling improving Neuro intact/unchanged left upper extremity Sensation intact to light touch on limb Extremity perfused Assessment / Plan : 
Swelling appears to be improving MRI does not show an obvious abscess. Still awaiting final radiologist read Continue IV ABx Will continue to monitor Patient Active Problem List  
Diagnosis Code  Severe obesity (BMI 35.0-39.9) ([facility]) E66.01  
 Cellulitis of left upper extremity L03.114 Signed By: Morales Hernandez MD  
 
I have reviewed the patients controlled substance prescription history, as maintained in the [facility] prescription monitoring program, so that the prescription(s) for a  controlled substance can be given.

## 2018-07-29 NOTE — PROGRESS NOTES
07/29/18 0725 Oxygen Therapy O2 Sat (%) 98 % Pulse via Oximetry 53 beats per minute O2 Device Room air

## 2018-07-29 NOTE — PROGRESS NOTES
Hospitalist Progress Note Admit Date:  2018  4:52 PM  
Name:  Norah Perales Age:  25 y.o. 
:  1995 MRN:  [id number] PCP:  Naty Javier DO Treatment Team: Attending Provider: Camilla Roy MD; Consulting Provider: Marta Harris MD; Utilization Review: Destiyn Benitez; Consulting Provider: Abhay Vaca MD 
 
Subjective:  
CC: Cat bite left hand Pt is a 26 yo male with no significant PMH. Pt works at a [employer] where he was bitten by a feral cat 4 days ago. Pt had been on Augmentin afterwards but the symptoms worsened. He had swelling and pain up to the forearm. He reports some numbness on the dorsal surface of his hand. He describes the pain as throbbing. His tetanus is up to date and he reports that he has had the rabies vaccine. Pt on IV cefepime and clindamycin. WBC decreased to 6.3, blood cultures no growth to date. Per ortho MRI does not show obvious abscess but final read not yet available. Cont IV antibx. Objective:  
 
Patient Vitals for the past 24 hrs: 
 Temp Pulse Resp BP SpO2  
18 0734 98 °F (36.7 °C) (!) 58 18 130/78 98 %  
18 0725 - - - - 98 %  
18 0400 98.4 °F (36.9 °C) 63 15 (!) 160/91 97 %  
18 2314 98.2 °F (36.8 °C) 70 15 131/76 94 %  
18 2000 99.4 °F (37.4 °C) 62 15 151/82 97 %  
18 1506 98.1 °F (36.7 °C) 66 14 131/83 96 %  
18 1143 98.1 °F (36.7 °C) 66 18 128/80 96 % Oxygen Therapy O2 Sat (%): 98 % (18 0734) Pulse via Oximetry: 53 beats per minute (18 07) O2 Device: Room air (18 07) Intake/Output Summary (Last 24 hours) at 18 0957 Last data filed at 18 9942 Gross per 24 hour Intake             1038 ml Output              950 ml Net               88 ml General:    Well nourished. Awake and alert. Head:  Normocephalic, atraumatic Eyes:  Extraocular movements intact, normal sclera Neck:  Supple, no lymphadenopathy or JVD 
CV:   RRR. No  Murmurs, clicks, or gallops Lungs:   Unlabored, no cyanosis Abdomen:   Soft, nondistended, nontender. Extremities: Warm and dry. Left hand and forearm swollen Skin:     No rashes or jaundice. Neuro:  No gross focal deficits Psych:  Mood and affect appropriate Data Review: 
I have reviewed all labs, meds, telemetry events, and studies from the last 24 hours. Recent Results (from the past 24 hour(s)) CBC WITH AUTOMATED DIFF Collection Time: 07/29/18  4:40 AM  
Result Value Ref Range WBC 6.3 4.3 - 11.1 K/uL  
 RBC 4.95 4.23 - 5.67 M/uL  
 HGB 14.6 13.6 - 17.2 g/dL HCT 42.4 41.1 - 50.3 % MCV 85.7 79.6 - 97.8 FL  
 MCH 29.5 26.1 - 32.9 PG  
 MCHC 34.4 31.4 - 35.0 g/dL  
 RDW 12.3 11.9 - 14.6 % PLATELET 931 890 - 260 K/uL MPV 10.0 (L) 10.8 - 14.1 FL  
 DF AUTOMATED NEUTROPHILS 49 43 - 78 % LYMPHOCYTES 37 13 - 44 % MONOCYTES 11 4.0 - 12.0 % EOSINOPHILS 3 0.5 - 7.8 % BASOPHILS 0 0.0 - 2.0 % IMMATURE GRANULOCYTES 0 0.0 - 5.0 %  
 ABS. NEUTROPHILS 3.0 1.7 - 8.2 K/UL  
 ABS. LYMPHOCYTES 2.4 0.5 - 4.6 K/UL  
 ABS. MONOCYTES 0.7 0.1 - 1.3 K/UL  
 ABS. EOSINOPHILS 0.2 0.0 - 0.8 K/UL  
 ABS. BASOPHILS 0.0 0.0 - 0.2 K/UL  
 ABS. IMM. GRANS. 0.0 0.0 - 0.5 K/UL METABOLIC PANEL, BASIC Collection Time: 07/29/18  4:40 AM  
Result Value Ref Range Sodium 141 136 - 145 mmol/L Potassium 3.9 3.5 - 5.1 mmol/L Chloride 106 98 - 107 mmol/L  
 CO2 27 21 - 32 mmol/L Anion gap 8 7 - 16 mmol/L Glucose 104 (H) 65 - 100 mg/dL BUN 10 6 - 23 MG/DL Creatinine 0.88 0.8 - 1.5 MG/DL  
 GFR est AA >60 >60 ml/min/1.73m2 GFR est non-AA >60 >60 ml/min/1.73m2 Calcium 8.8 8.3 - 10.4 MG/DL All Micro Results Procedure Component Value Units Date/Time CULTURE, BLOOD [[id number]] Collected:  07/27/18 8674 Order Status:  Completed Specimen:  Blood from Blood Updated:  07/28/18 3319 Special Requests: --     
  RIGHT Antecubital 
  
  Culture result: NO GROWTH AFTER 14 HOURS     
 CULTURE, BLOOD [[id number]] Collected:  07/27/18 1810 Order Status:  Completed Specimen:  Blood from Blood Updated:  07/28/18 7351 Special Requests: --     
  RIGHT Antecubital 
  
  Culture result: NO GROWTH AFTER 14 HOURS Current Meds: 
Current Facility-Administered Medications Medication Dose Route Frequency  morphine injection 2 mg  2 mg IntraVENous Q6H PRN  
 ondansetron (ZOFRAN) injection 4 mg  4 mg IntraVENous Q6H PRN  
 diphenhydrAMINE (BENADRYL) capsule 25 mg  25 mg Oral Q4H PRN  
 ketorolac (TORADOL) injection 15 mg  15 mg IntraVENous Q6H PRN  
 cefepime (MAXIPIME) 1 g in 0.9% sodium chloride (MBP/ADV) 50 mL ADV  1 g IntraVENous Q12H  
 HYDROcodone-acetaminophen (NORCO) 5-325 mg per tablet 1 Tab  1 Tab Oral Q4H PRN  
 sodium chloride (NS) flush 5-10 mL  5-10 mL IntraVENous Q8H  
 sodium chloride (NS) flush 5-10 mL  5-10 mL IntraVENous PRN  
 acetaminophen (TYLENOL) tablet 650 mg  650 mg Oral Q6H PRN  
 clindamycin phosphate (CLEOCIN) 600 mg, ADDaptor 1 Device in 0.9% sodium chloride (MBP/ADV) 100 mL ADV  600 mg IntraVENous Q8H Diet: DIET REGULAR Other Studies (last 24 hours): No results found. Assessment and Plan:  
 
Hospital Problems as of 7/29/2018  Date Reviewed: 7/15/2018 Codes Class Noted - Resolved POA * (Principal)Cellulitis of left upper extremity ICD-10-CM: L03.114 
ICD-9-CM: 682.3  7/27/2018 - Present Yes Severe obesity (BMI 35.0-39.9) [facility] ICD-10-CM: E66.01 
ICD-9-CM: 278.01  7/16/2018 - Present Yes A/P:   
1. Cellulitis left upper extremity Continue IV cefepime and clindamycin Ortho consult Pain management - toradol added and MS frequency decreased Supportive care 2. Obesity Education DC planning/Dispo:  Home DVT ppx:  SCDs Case reviewed with supervising physician - MARCIA Junior MD 
 
Signed: 
JHOANA Abdi

## 2018-07-29 NOTE — PROGRESS NOTES
END OF SHIFT NOTE: 
 
INTAKE/OUTPUT 
07/28 0701 - 07/29 0700 In: 1440 [I.V.:1440] Out: 950 [Urine:950] Voiding: YES Catheter: NO 
Drain:   
 
 
 
 
 
Flatus: Patient does have flatus present. Stool:  1 occurrences. Characteristics: 
  
 
Emesis: 0 occurrences. Characteristics: VITAL SIGNS Patient Vitals for the past 12 hrs: 
 Temp Pulse Resp BP SpO2  
07/29/18 1520 98.2 °F (36.8 °C) 62 17 132/71 98 %  
07/29/18 1157 98.2 °F (36.8 °C) (!) 51 16 166/82 97 %  
07/29/18 0734 98 °F (36.7 °C) (!) 58 18 130/78 98 % Pain Assessment Pain Intensity 1: 8 (07/29/18 1204) Pain Location 1: Hand Pain Intervention(s) 1: Medication (see MAR) Patient Stated Pain Goal: 0 Ambulating No 
 
Shift report given to oncoming nurse at the bedside. Has less edema and redness on left hand today and more range of motion.  
 
Safia Natarajan RN

## 2018-07-30 VITALS
WEIGHT: 270 LBS | TEMPERATURE: 98 F | DIASTOLIC BLOOD PRESSURE: 73 MMHG | OXYGEN SATURATION: 98 % | HEART RATE: 51 BPM | BODY MASS INDEX: 38.65 KG/M2 | RESPIRATION RATE: 18 BRPM | SYSTOLIC BLOOD PRESSURE: 140 MMHG | HEIGHT: 70 IN

## 2018-07-30 PROBLEM — Z91.199 NO-SHOW FOR APPOINTMENT: Status: ACTIVE | Noted: 2018-07-30

## 2018-07-30 PROCEDURE — 74011250636 HC RX REV CODE- 250/636: Performed by: HOSPITALIST

## 2018-07-30 PROCEDURE — 74011000258 HC RX REV CODE- 258: Performed by: HOSPITALIST

## 2018-07-30 PROCEDURE — 74011000250 HC RX REV CODE- 250: Performed by: HOSPITALIST

## 2018-07-30 PROCEDURE — 74011250637 HC RX REV CODE- 250/637: Performed by: FAMILY MEDICINE

## 2018-07-30 PROCEDURE — 74011250636 HC RX REV CODE- 250/636: Performed by: INTERNAL MEDICINE

## 2018-07-30 PROCEDURE — 77030020263 HC SOL INJ SOD CL0.9% LFCR 1000ML

## 2018-07-30 RX ORDER — DOXYCYCLINE 100 MG/1
100 CAPSULE ORAL 2 TIMES DAILY
Qty: 28 CAP | Refills: 0 | Status: SHIPPED | OUTPATIENT
Start: 2018-07-30 | End: 2018-08-13 | Stop reason: ALTCHOICE

## 2018-07-30 RX ORDER — BISACODYL 5 MG
5 TABLET, DELAYED RELEASE (ENTERIC COATED) ORAL DAILY PRN
Status: DISCONTINUED | OUTPATIENT
Start: 2018-07-30 | End: 2018-07-30

## 2018-07-30 RX ORDER — BISACODYL 5 MG
5 TABLET, DELAYED RELEASE (ENTERIC COATED) ORAL
Status: COMPLETED | OUTPATIENT
Start: 2018-07-30 | End: 2018-07-30

## 2018-07-30 RX ADMIN — SODIUM CHLORIDE 600 MG: 900 INJECTION, SOLUTION INTRAVENOUS at 11:16

## 2018-07-30 RX ADMIN — SODIUM CHLORIDE 1 G: 900 INJECTION, SOLUTION INTRAVENOUS at 06:23

## 2018-07-30 RX ADMIN — KETOROLAC TROMETHAMINE 15 MG: 15 INJECTION, SOLUTION INTRAMUSCULAR; INTRAVENOUS at 10:29

## 2018-07-30 RX ADMIN — SODIUM CHLORIDE 600 MG: 900 INJECTION, SOLUTION INTRAVENOUS at 03:58

## 2018-07-30 RX ADMIN — Medication 10 ML: at 06:23

## 2018-07-30 RX ADMIN — KETOROLAC TROMETHAMINE 15 MG: 15 INJECTION, SOLUTION INTRAMUSCULAR; INTRAVENOUS at 03:58

## 2018-07-30 RX ADMIN — BISACODYL 5 MG: 5 TABLET, COATED ORAL at 00:33

## 2018-07-30 NOTE — PROGRESS NOTES
ORTH PROGRESS NOTE 2018 Admit Date:  
2018 Post Op day: * No surgery found * Subjective:   
Tova Nowak doing better PT/OT:  
Gait:    
            
 
Vital Signs:   
Patient Vitals for the past 8 hrs: 
 BP Temp Pulse Resp SpO2  
18 0726 133/79 98.2 °F (36.8 °C) (!) 55 18 98 % 18 0350 155/80 98 °F (36.7 °C) (!) 50 18 96 % Temp (24hrs), Av.2 °F (36.8 °C), Min:97.9 °F (36.6 °C), Max:98.5 °F (36.9 °C) Pain Control:  
Pain Assessment Pain Scale 1: Numeric (0 - 10) Pain Intensity 1: 6 Pain Onset 1: ongoing Pain Location 1: Hand Pain Orientation 1: Left Pain Description 1: Aching Pain Intervention(s) 1: Declines Meds: 
 
Current Facility-Administered Medications Medication Dose Route Frequency  morphine injection 2 mg  2 mg IntraVENous Q6H PRN  
 ondansetron (ZOFRAN) injection 4 mg  4 mg IntraVENous Q6H PRN  
 diphenhydrAMINE (BENADRYL) capsule 25 mg  25 mg Oral Q4H PRN  
 ketorolac (TORADOL) injection 15 mg  15 mg IntraVENous Q6H PRN  
 cefepime (MAXIPIME) 1 g in 0.9% sodium chloride (MBP/ADV) 50 mL ADV  1 g IntraVENous Q12H  
 HYDROcodone-acetaminophen (NORCO) 5-325 mg per tablet 1 Tab  1 Tab Oral Q4H PRN  
 sodium chloride (NS) flush 5-10 mL  5-10 mL IntraVENous Q8H  
 sodium chloride (NS) flush 5-10 mL  5-10 mL IntraVENous PRN  
 acetaminophen (TYLENOL) tablet 650 mg  650 mg Oral Q6H PRN  
 clindamycin phosphate (CLEOCIN) 600 mg, ADDaptor 1 Device in 0.9% sodium chloride (MBP/ADV) 100 mL ADV  600 mg IntraVENous Q8H  
 
 
LAB:   
Recent Labs  
   18 
 0440   18 
 1754 HCT  42.4   < >   --   
HGB  14.6   < >   --   
INR   --    --   1.1  
 < > = values in this interval not displayed. Assessment & Physician's Comment: 
 
Pt evaluated. Swelling much improved. Minimal pain on palpation. Small amount of clear fluid expressed without pain. Cellulitis improved.  
 
Principal Problem: 
  Cellulitis of left upper extremity (7/27/2018) Active Problems: 
  Severe obesity (BMI 35.0-39.9) [facility] (7/16/2018) Plan: 
Improving cellulitis on IV abx. No abscess on MRI Pain improving. White count normal. 
Can follow up with ortho if needed. Would advise at least 2 weeks of oral antibiotics on discharge. Patient may already have these prescriptions Eneida Ochoa MD

## 2018-07-30 NOTE — DISCHARGE SUMMARY
Hospitalist Discharge Summary     Admit Date:  2018  4:52 PM   Name:  Nakul Yepez   Age:  25 y.o.  :  1995   MRN:  [id number]   PCP:  Naveen Beltre DO  Treatment Team: Attending Provider: Mitchel Greene MD; Consulting Provider: Alivia Cleary MD; Care Manager: Donn Pereira RN; Utilization Review: Chepe Loo RN    Problem List for this Hospitalization:  Hospital Problems as of 2018  Date Reviewed: 2018          Codes Class Noted - Resolved POA    * (Principal)Cellulitis of left upper extremity ICD-10-CM: L03.114  ICD-9-CM: 682.3  2018 - Present Yes        Severe obesity (BMI 35.0-39.9) [facility] ICD-10-CM: E66.01  ICD-9-CM: 278.01  2018 - Present Yes                Admission HPI from 2018:    \"  \"    Hospital Course:   Pt is a 26 yo male with no significant PMH. 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 Pt had been on Augmentin afterwards but the symptoms worsened. He had swelling and pain up to the forearm. He reported numbness on the dorsal surface of his hand. He described the pain as throbbing. His tetanus is up to date and he reports that he has had the rabies vaccine. Pt was treated with IV cefepime and clindamycin. WBC decreased to 6.3, blood cultures negative. MRI does not show obvious abscess or osteomyelitis. Per ortho pt ready for d/c , needs to be on oral antibx x 2 wks. Will send home on doxycycline x 14 days. Follow up instructions and discharge meds at bottom of this note. Plan was discussed with patient, care team.  All questions answered. Patient was stable at time of discharge. Diagnostic Imaging/Tests:   Xr Hand Lt Min 3 V    Result Date: 2018  Left hand INDICATION: Left hand pain, recent cat bite Three views of the left hand were obtained. FINDINGS: There is diffuse soft tissue swelling. No foreign body is seen.  There is no evidence of fracture or dislocation. There are no bony erosions. There is no significant joint space narrowing. IMPRESSION:  Soft tissue swelling. No bony abnormality. Mri Hand Dar Dejesus Cont    Result Date: 7/30/2018  EXAM: Left hand MRI with and without contrast. ADDITIONAL CLINICAL INFORMATION: Left hand swelling after cat bite, possible infection. COMPARISON: Left hand films dated July 27, 2018 Contrast: 25 mL IV Dotarem. Findings: There is preserved T1 fatty bone marrow signal intensity of the hand. No evidence of osteomyelitis. There is extensive dorsal hand and wrist soft tissue swelling with fluid seen tracking in the subcutaneous soft tissues without a defined walled off fluid collection to suggest abscess formation. There is fluid seen tracking along the dorsum of the fingers in the region of the extensor tendons of all fingers. There is fluid adjacent to the extensor pollicis longus tendon at the level of the wrist without evidence of discrete fluid within the tendon sheath. There is fluid seen within the tendon sheath of the extensor carpi radialis longus, image 47 series 4. The carpal tunnel is unremarkable. Impression: 1. Extensive dorsal swelling of the hand and wrist with diffuse subcutaneous soft tissue edema consistent with cellulitis without a discrete abscess and without evidence of osteomyelitis. This cellulitis extends along the dorsum of all 5 fingers. This process extends proximal to the field of view at the level of the wrist. 2. Of note there is fluid seen tracking within the extensor carpi radialis longus tendon sheath at the level of the wrist concerning for infectious tenosynovitis. Dedicated wrist MRI can be considered for further evaluation if clinically indicated. Echocardiogram results:  No results found for this visit on 07/27/18.       All Micro Results     Procedure Component Value Units Date/Time    CULTURE, BLOOD [[id number]] Collected:  07/27/18 0650    Order Status: Completed Specimen:  Blood from Blood Updated:  07/30/18 0628     Special Requests: --        RIGHT  Antecubital       Culture result: NO GROWTH 3 DAYS       CULTURE, BLOOD [[id number]] Collected:  07/27/18 1810    Order Status:  Completed Specimen:  Blood from Blood Updated:  07/30/18 0628     Special Requests: --        RIGHT  Antecubital       Culture result: NO GROWTH 3 DAYS             Labs: Results:       BMP, Mg, Phos Recent Labs      07/29/18 0440 07/28/18 0348 07/27/18   1503   NA  141  140  138   K  3.9  3.6  5.1   CL  106  105  104   CO2  27 29 28   AGAP  8  6*  6*   BUN  10  9  9   CREA  0.88  0.95  0.92   CA  8.8  8.4  8.9   GLU  104*  106*  95      CBC Recent Labs      07/29/18 0440 07/28/18 0348 07/27/18   1503   WBC  6.3  9.3  8.8   RBC  4.95  5.10  5.50   HGB  14.6  15.2  16.6   HCT  42.4  43.6  46.4   PLT  238  228  255   GRANS  49  59  66   LYMPH  37  30  26   EOS  3  2  1   MONOS  11  9  7   BASOS  0  0  0   IG  0  0  0   ANEU  3.0  5.5  5.7   ABL  2.4  2.8  2.3   KYLEIGH  0.2  0.2  0.1   ABM  0.7  0.9  0.6   ABB  0.0  0.0  0.0   AIG  0.0  0.0  0.0      LFT Recent Labs      07/28/18 0348 07/27/18   1503   SGOT  18  47*   ALT  44  58   AP  68  72   TP  6.9  8.1   ALB  3.3*  3.6   GLOB  3.6*  4.5*   AGRAT  0.9*  0.8*      Cardiac Testing No results found for: BNPP, BNP, CPK, RCK1, RCK2, RCK3, RCK4, CKMB, CKNDX, CKND1, TROPT, TROIQ   Coagulation Tests Lab Results   Component Value Date/Time    Prothrombin time 13.4 07/27/2018 05:54 PM    INR 1.1 07/27/2018 05:54 PM    aPTT 25.1 07/27/2018 05:54 PM      A1c No results found for: HBA1C, HGBE8, LAA2DBVB   Lipid Panel No results found for: CHOL, CHOLPOCT, CHOLX, CHLST, CHOLV, 151052, HDL, LDL, LDLC, DLDLP, 533978, VLDLC, VLDL, TGLX, TRIGL, TRIGP, TGLPOCT, CHHD, CHHDX   Thyroid Panel No results found for: TSH, T4, FT4, TT3, T3U, TSHEXT     Most Recent UA No results found for: COLOR, APPRN, [address], AMY, PROTU, GLUCU, KETU, BILU, BLDU, UROU, [place] Anand     No Known Allergies  Immunization History   Administered Date(s) Administered    Influenza Vaccine PF 11/01/2011, 10/22/2012, 10/18/2013, 12/15/2015, 12/18/2015    Meningococcal (MCV4O) Vaccine 05/08/2014    TB Skin Test (PPD) Intradermal 05/12/2015       All Labs from Last 24 Hrs:  No results found for this or any previous visit (from the past 24 hour(s)). Discharge Exam:  Patient Vitals for the past 24 hrs:   Temp Pulse Resp BP SpO2   07/30/18 1144 98 °F (36.7 °C) (!) 51 18 140/73 98 %   07/30/18 0726 98.2 °F (36.8 °C) (!) 55 18 133/79 98 %   07/30/18 0350 98 °F (36.7 °C) (!) 50 18 155/80 96 %   07/30/18 0001 98.5 °F (36.9 °C) (!) 52 18 145/75 98 %   07/29/18 1930 97.9 °F (36.6 °C) 69 16 145/81 97 %   07/29/18 1520 98.2 °F (36.8 °C) 62 17 132/71 98 %     Oxygen Therapy  O2 Sat (%): 98 % (07/30/18 1144)  Pulse via Oximetry: 53 beats per minute (07/29/18 0725)  O2 Device: Room air (07/30/18 0750)    Intake/Output Summary (Last 24 hours) at 07/30/18 1259  Last data filed at 07/29/18 2315   Gross per 24 hour   Intake               33 ml   Output              700 ml   Net             -667 ml       General:                    Well nourished. Awake and alert. Head:                                   Normocephalic, atraumatic  Eyes:                                   Extraocular movements intact, normal sclera  Neck:                                   Supple, no lymphadenopathy or JVD  CV:                                      RRR. No  Murmurs, clicks, or gallops  Lungs:                       Unlabored, no cyanosis  Abdomen:                  Soft, nondistended, nontender. Extremities:               Warm and dry. Left hand and forearm swollen  Skin:                                    No rashes or jaundice.    Neuro:                                 No gross focal deficits  Psych:                                 Mood and affect appropriate    Discharge Info:   Discharge Medication List as of 7/30/2018 1:21 PM      START taking these medications    Details   doxycycline (MONODOX) 100 mg capsule Take 1 Cap by mouth two (2) times a day., Print, Disp-28 Cap, R-0         CONTINUE these medications which have NOT CHANGED    Details   HYDROcodone-acetaminophen (NORCO) 5-325 mg per tablet Take 1 Tab by mouth every four (4) hours as needed for Pain. Max Daily Amount: 6 Tabs., Print, Disp-12 Tab, R-0         STOP taking these medications       amoxicillin-clavulanate (AUGMENTIN) 875-125 mg per tablet Comments:   Reason for Stopping:                 Disposition: home    Activity: Activity as tolerated  Diet: DIET REGULAR    Follow-up Appointments   Procedures    FOLLOW UP VISIT Appointment in: Ten Days Follow up with PCP within 2 wks     Follow up with PCP within 2 wks     Standing Status:   Standing     Number of Occurrences:   1     Order Specific Question:   Appointment in     Answer:   Ten Days     Follow up with orthopedics if needed. Follow-up Information     Follow up With Details Comments R [address]lee [address], [place]   [address] [facility] [address] [phone]            Case reviewed with supervising physician - MARCIA Turner MD    Time spent in patient discharge planning and coordination 35 minutes.     Signed:  Tawana Castleman, NP-C

## 2018-07-30 NOTE — DISCHARGE INSTRUCTIONS
DISCHARGE SUMMARY from Nurse    PATIENT INSTRUCTIONS:    After general anesthesia or intravenous sedation, for 24 hours or while taking prescription Narcotics:  · Limit your activities  · Do not drive and operate hazardous machinery  · Do not make important personal or business decisions  · Do  not drink alcoholic beverages  · If you have not urinated within 8 hours after discharge, please contact your surgeon on call. Report the following to your surgeon:  · Excessive pain, swelling, redness or odor of or around the surgical area  · Temperature over 100.5  · Nausea and vomiting lasting longer than 4 hours or if unable to take medications  · Any signs of decreased circulation or nerve impairment to extremity: change in color, persistent  numbness, tingling, coldness or increase pain  · Any questions    What to do at Home:  Recommended activity: Activity as tolerated,     If you experience any of the following symptoms fever greater then 100.5 pain unrelieved be medication, increase in shortness of breath, please follow up with primary care doctor. *  Please give a list of your current medications to your Primary Care Provider. *  Please update this list whenever your medications are discontinued, doses are      changed, or new medications (including over-the-counter products) are added. *  Please carry medication information at all times in case of emergency situations. These are general instructions for a healthy lifestyle:    No smoking/ No tobacco products/ Avoid exposure to second hand smoke  Surgeon General's Warning:  Quitting smoking now greatly reduces serious risk to your health.     Obesity, smoking, and sedentary lifestyle greatly increases your risk for illness    A healthy diet, regular physical exercise & weight monitoring are important for maintaining a healthy lifestyle    You may be retaining fluid if you have a history of heart failure or if you experience any of the following symptoms:  Weight gain of 3 pounds or more overnight or 5 pounds in a week, increased swelling in our hands or feet or shortness of breath while lying flat in bed. Please call your doctor as soon as you notice any of these symptoms; do not wait until your next office visit. Recognize signs and symptoms of STROKE:    F-face looks uneven    A-arms unable to move or move unevenly    S-speech slurred or non-existent    T-time-call 911 as soon as signs and symptoms begin-DO NOT go       Back to bed or wait to see if you get better-TIME IS BRAIN. Warning Signs of HEART ATTACK     Call 911 if you have these symptoms:   Chest discomfort. Most heart attacks involve discomfort in the center of the chest that lasts more than a few minutes, or that goes away and comes back. It can feel like uncomfortable pressure, squeezing, fullness, or pain.  Discomfort in other areas of the upper body. Symptoms can include pain or discomfort in one or both arms, the back, neck, jaw, or stomach.  Shortness of breath with or without chest discomfort.  Other signs may include breaking out in a cold sweat, nausea, or lightheadedness. Don't wait more than five minutes to call 911 - MINUTES MATTER! Fast action can save your life. Calling 911 is almost always the fastest way to get lifesaving treatment. Emergency Medical Services staff can begin treatment when they arrive -- up to an hour sooner than if someone gets to the hospital by car. The discharge information has been reviewed with the patient. The patient verbalized understanding. Discharge medications reviewed with the patient and appropriate educational materials and side effects teaching were provided.   ___________________________________________________________________________________________________________________________________     Cellulitis: Care Instructions  Your Care Instructions    Cellulitis is a skin infection caused by bacteria, most often strep or staph. It often occurs after a break in the skin from a scrape, cut, bite, or puncture, or after a rash. Cellulitis may be treated without doing tests to find out what caused it. But your doctor may do tests, if needed, to look for a specific bacteria, like methicillin-resistant Staphylococcus aureus (MRSA). The doctor has checked you carefully, but problems can develop later. If you notice any problems or new symptoms, get medical treatment right away. Follow-up care is a key part of your treatment and safety. Be sure to make and go to all appointments, and call your doctor if you are having problems. It's also a good idea to know your test results and keep a list of the medicines you take. How can you care for yourself at home? · Take your antibiotics as directed. Do not stop taking them just because you feel better. You need to take the full course of antibiotics. · Prop up the infected area on pillows to reduce pain and swelling. Try to keep the area above the level of your heart as often as you can. · If your doctor told you how to care for your wound, follow your doctor's instructions. If you did not get instructions, follow this general advice:  ¨ Wash the wound with clean water 2 times a day. Don't use hydrogen peroxide or alcohol, which can slow healing. ¨ You may cover the wound with a thin layer of petroleum jelly, such as Vaseline, and a nonstick bandage. ¨ Apply more petroleum jelly and replace the bandage as needed. · Be safe with medicines. Take pain medicines exactly as directed. ¨ If the doctor gave you a prescription medicine for pain, take it as prescribed. ¨ If you are not taking a prescription pain medicine, ask your doctor if you can take an over-the-counter medicine. To prevent cellulitis in the future  · Try to prevent cuts, scrapes, or other injuries to your skin. Cellulitis most often occurs where there is a break in the skin.   · If you get a scrape, cut, mild burn, or bite, wash the wound with clean water as soon as you can to help avoid infection. Don't use hydrogen peroxide or alcohol, which can slow healing. · If you have swelling in your legs (edema), support stockings and good skin care may help prevent leg sores and cellulitis. · Take care of your feet, especially if you have diabetes or other conditions that increase the risk of infection. Wear shoes and socks. Do not go barefoot. If you have athlete's foot or other skin problems on your feet, talk to your doctor about how to treat them. When should you call for help? Call your doctor now or seek immediate medical care if:    · You have signs that your infection is getting worse, such as:  ¨ Increased pain, swelling, warmth, or redness. ¨ Red streaks leading from the area. ¨ Pus draining from the area. ¨ A fever.     · You get a rash.    Watch closely for changes in your health, and be sure to contact your doctor if:    · You do not get better as expected. Where can you learn more? Go to http://dante-iek.info/. Sue Little in the search box to learn more about \"Cellulitis: Care Instructions. \"  Current as of: May 10, 2017  Content Version: 11.7  © 5964-0723 [employer]. Care instructions adapted under license by [employer] (which disclaims liability or warranty for this information). If you have questions about a medical condition or this instruction, always ask your healthcare professional. [address] any warranty or liability for your use of this information.

## 2018-07-30 NOTE — PROGRESS NOTES
Gave discharge instructions to the pt. The pt vocies a clear understanding. The IV was removed x 1 pt will call desk when ready to go out.

## 2018-08-01 LAB
BACTERIA SPEC CULT: NORMAL
BACTERIA SPEC CULT: NORMAL
SERVICE CMNT-IMP: NORMAL
SERVICE CMNT-IMP: NORMAL

## 2018-08-09 PROBLEM — K42.9 UMBILICAL HERNIA: Status: ACTIVE | Noted: 2018-08-09

## 2018-08-09 PROBLEM — K43.9 VENTRAL HERNIA: Status: ACTIVE | Noted: 2018-08-09
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
## 2018-08-13 PROBLEM — T79.A12A TRAUMATIC COMPARTMENT SYNDROME OF LEFT UPPER EXTREMITY (HCC): Status: ACTIVE | Noted: 2018-08-13

## 2018-08-13 PROBLEM — M79.89 SWELLING OF LEFT HAND: Status: ACTIVE | Noted: 2018-08-13

## 2018-08-14 ENCOUNTER — ANESTHESIA EVENT (OUTPATIENT)
Dept: SURGERY | Age: 23
End: 2018-08-14
Payer: COMMERCIAL

## 2018-08-14 ENCOUNTER — HOSPITAL ENCOUNTER (OUTPATIENT)
Age: 23
Setting detail: OUTPATIENT SURGERY
Discharge: HOME | End: 2018-08-14
Attending: SURGERY | Admitting: SURGERY
Payer: COMMERCIAL

## 2018-08-14 ENCOUNTER — ANESTHESIA (OUTPATIENT)
Dept: SURGERY | Age: 23
End: 2018-08-14
Payer: COMMERCIAL

## 2018-08-14 VITALS
BODY MASS INDEX: 38.27 KG/M2 | TEMPERATURE: 97.7 F | HEIGHT: 70 IN | WEIGHT: 267.3 LBS | SYSTOLIC BLOOD PRESSURE: 125 MMHG | RESPIRATION RATE: 13 BRPM | DIASTOLIC BLOOD PRESSURE: 66 MMHG | OXYGEN SATURATION: 95 % | HEART RATE: 60 BPM

## 2018-08-14 PROBLEM — K43.6 INCARCERATED VENTRAL HERNIA: Status: ACTIVE | Noted: 2018-08-09

## 2018-08-14 PROCEDURE — 74011250636 HC RX REV CODE- 250/636: Performed by: SURGERY

## 2018-08-14 PROCEDURE — 77030002967 HC SUT PDS J&J -B: Performed by: SURGERY

## 2018-08-14 PROCEDURE — 74011000250 HC RX REV CODE- 250: Performed by: SURGERY

## 2018-08-14 PROCEDURE — 74011250636 HC RX REV CODE- 250/636: Performed by: ANESTHESIOLOGY

## 2018-08-14 PROCEDURE — 74011000250 HC RX REV CODE- 250: Performed by: ANESTHESIOLOGY

## 2018-08-14 PROCEDURE — 77030008477 HC STYL SATN SLP COVD -A: Performed by: ANESTHESIOLOGY

## 2018-08-14 PROCEDURE — 77030020782 HC GWN BAIR PAWS FLX 3M -B: Performed by: ANESTHESIOLOGY

## 2018-08-14 PROCEDURE — 77030019908 HC STETH ESOPH SIMS -A: Performed by: ANESTHESIOLOGY

## 2018-08-14 PROCEDURE — 77030032490 HC SLV COMPR SCD KNE COVD -B: Performed by: SURGERY

## 2018-08-14 PROCEDURE — 77030018673: Performed by: SURGERY

## 2018-08-14 PROCEDURE — 76210000021 HC REC RM PH II 0.5 TO 1 HR: Performed by: SURGERY

## 2018-08-14 PROCEDURE — 77030008467 HC STPLR SKN COVD -B: Performed by: SURGERY

## 2018-08-14 PROCEDURE — 77030018836 HC SOL IRR NACL ICUM -A: Performed by: SURGERY

## 2018-08-14 PROCEDURE — 74011250636 HC RX REV CODE- 250/636

## 2018-08-14 PROCEDURE — 74011250637 HC RX REV CODE- 250/637: Performed by: ANESTHESIOLOGY

## 2018-08-14 PROCEDURE — 74011000250 HC RX REV CODE- 250

## 2018-08-14 PROCEDURE — 76010000160 HC OR TIME 0.5 TO 1 HR INTENSV-TIER 1: Performed by: SURGERY

## 2018-08-14 PROCEDURE — 76210000063 HC OR PH I REC FIRST 0.5 HR: Performed by: SURGERY

## 2018-08-14 PROCEDURE — 77030031139 HC SUT VCRL2 J&J -A: Performed by: SURGERY

## 2018-08-14 PROCEDURE — 76060000033 HC ANESTHESIA 1 TO 1.5 HR: Performed by: SURGERY

## 2018-08-14 PROCEDURE — 77030008703 HC TU ET UNCUF COVD -A: Performed by: ANESTHESIOLOGY

## 2018-08-14 RX ORDER — BUPIVACAINE HYDROCHLORIDE 2.5 MG/ML
INJECTION, SOLUTION EPIDURAL; INFILTRATION; INTRACAUDAL AS NEEDED
Status: DISCONTINUED | OUTPATIENT
Start: 2018-08-14 | End: 2018-08-14 | Stop reason: HOSPADM

## 2018-08-14 RX ORDER — GLYCOPYRROLATE 0.2 MG/ML
INJECTION INTRAMUSCULAR; INTRAVENOUS AS NEEDED
Status: DISCONTINUED | OUTPATIENT
Start: 2018-08-14 | End: 2018-08-14 | Stop reason: HOSPADM

## 2018-08-14 RX ORDER — SODIUM CHLORIDE 0.9 % (FLUSH) 0.9 %
5-10 SYRINGE (ML) INJECTION AS NEEDED
Status: DISCONTINUED | OUTPATIENT
Start: 2018-08-14 | End: 2018-08-14 | Stop reason: HOSPADM

## 2018-08-14 RX ORDER — DEXAMETHASONE SODIUM PHOSPHATE 4 MG/ML
INJECTION, SOLUTION INTRA-ARTICULAR; INTRALESIONAL; INTRAMUSCULAR; INTRAVENOUS; SOFT TISSUE AS NEEDED
Status: DISCONTINUED | OUTPATIENT
Start: 2018-08-14 | End: 2018-08-14 | Stop reason: HOSPADM

## 2018-08-14 RX ORDER — CLINDAMYCIN HYDROCHLORIDE 300 MG/1
300 CAPSULE ORAL 3 TIMES DAILY
COMMUNITY
End: 2021-05-20

## 2018-08-14 RX ORDER — HYDROMORPHONE HYDROCHLORIDE 2 MG/ML
INJECTION, SOLUTION INTRAMUSCULAR; INTRAVENOUS; SUBCUTANEOUS AS NEEDED
Status: DISCONTINUED | OUTPATIENT
Start: 2018-08-14 | End: 2018-08-14 | Stop reason: HOSPADM

## 2018-08-14 RX ORDER — SODIUM CHLORIDE, SODIUM LACTATE, POTASSIUM CHLORIDE, CALCIUM CHLORIDE 600; 310; 30; 20 MG/100ML; MG/100ML; MG/100ML; MG/100ML
125 INJECTION, SOLUTION INTRAVENOUS CONTINUOUS
Status: DISCONTINUED | OUTPATIENT
Start: 2018-08-14 | End: 2018-08-14 | Stop reason: HOSPADM

## 2018-08-14 RX ORDER — ACETAMINOPHEN 500 MG
1000 TABLET ORAL ONCE
Status: COMPLETED | OUTPATIENT
Start: 2018-08-14 | End: 2018-08-14

## 2018-08-14 RX ORDER — LIDOCAINE HYDROCHLORIDE 20 MG/ML
INJECTION, SOLUTION EPIDURAL; INFILTRATION; INTRACAUDAL; PERINEURAL AS NEEDED
Status: DISCONTINUED | OUTPATIENT
Start: 2018-08-14 | End: 2018-08-14 | Stop reason: HOSPADM

## 2018-08-14 RX ORDER — FENTANYL CITRATE 50 UG/ML
INJECTION, SOLUTION INTRAMUSCULAR; INTRAVENOUS AS NEEDED
Status: DISCONTINUED | OUTPATIENT
Start: 2018-08-14 | End: 2018-08-14 | Stop reason: HOSPADM

## 2018-08-14 RX ORDER — OXYCODONE HYDROCHLORIDE 5 MG/1
10 TABLET ORAL
Status: COMPLETED | OUTPATIENT
Start: 2018-08-14 | End: 2018-08-14

## 2018-08-14 RX ORDER — LIDOCAINE HYDROCHLORIDE 10 MG/ML
0.1 INJECTION INFILTRATION; PERINEURAL AS NEEDED
Status: DISCONTINUED | OUTPATIENT
Start: 2018-08-14 | End: 2018-08-14 | Stop reason: HOSPADM

## 2018-08-14 RX ORDER — PROPOFOL 10 MG/ML
INJECTION, EMULSION INTRAVENOUS AS NEEDED
Status: DISCONTINUED | OUTPATIENT
Start: 2018-08-14 | End: 2018-08-14 | Stop reason: HOSPADM

## 2018-08-14 RX ORDER — SODIUM CHLORIDE 0.9 % (FLUSH) 0.9 %
5-10 SYRINGE (ML) INJECTION EVERY 8 HOURS
Status: DISCONTINUED | OUTPATIENT
Start: 2018-08-14 | End: 2018-08-14 | Stop reason: HOSPADM

## 2018-08-14 RX ORDER — ONDANSETRON 2 MG/ML
INJECTION INTRAMUSCULAR; INTRAVENOUS AS NEEDED
Status: DISCONTINUED | OUTPATIENT
Start: 2018-08-14 | End: 2018-08-14 | Stop reason: HOSPADM

## 2018-08-14 RX ORDER — MIDAZOLAM HYDROCHLORIDE 1 MG/ML
2 INJECTION, SOLUTION INTRAMUSCULAR; INTRAVENOUS
Status: COMPLETED | OUTPATIENT
Start: 2018-08-14 | End: 2018-08-14

## 2018-08-14 RX ORDER — HYDROMORPHONE HYDROCHLORIDE 2 MG/ML
0.5 INJECTION, SOLUTION INTRAMUSCULAR; INTRAVENOUS; SUBCUTANEOUS
Status: DISCONTINUED | OUTPATIENT
Start: 2018-08-14 | End: 2018-08-14 | Stop reason: HOSPADM

## 2018-08-14 RX ORDER — KETOROLAC TROMETHAMINE 30 MG/ML
INJECTION, SOLUTION INTRAMUSCULAR; INTRAVENOUS AS NEEDED
Status: DISCONTINUED | OUTPATIENT
Start: 2018-08-14 | End: 2018-08-14 | Stop reason: HOSPADM

## 2018-08-14 RX ORDER — NEOSTIGMINE METHYLSULFATE 1 MG/ML
INJECTION INTRAVENOUS AS NEEDED
Status: DISCONTINUED | OUTPATIENT
Start: 2018-08-14 | End: 2018-08-14 | Stop reason: HOSPADM

## 2018-08-14 RX ORDER — NALOXONE HYDROCHLORIDE 0.4 MG/ML
0.1 INJECTION, SOLUTION INTRAMUSCULAR; INTRAVENOUS; SUBCUTANEOUS AS NEEDED
Status: DISCONTINUED | OUTPATIENT
Start: 2018-08-14 | End: 2018-08-14 | Stop reason: HOSPADM

## 2018-08-14 RX ORDER — ROCURONIUM BROMIDE 10 MG/ML
INJECTION, SOLUTION INTRAVENOUS AS NEEDED
Status: DISCONTINUED | OUTPATIENT
Start: 2018-08-14 | End: 2018-08-14 | Stop reason: HOSPADM

## 2018-08-14 RX ORDER — KETOROLAC TROMETHAMINE 30 MG/ML
30 INJECTION, SOLUTION INTRAMUSCULAR; INTRAVENOUS AS NEEDED
Status: DISCONTINUED | OUTPATIENT
Start: 2018-08-14 | End: 2018-08-14 | Stop reason: HOSPADM

## 2018-08-14 RX ADMIN — LIDOCAINE HYDROCHLORIDE 100 MG: 20 INJECTION, SOLUTION EPIDURAL; INFILTRATION; INTRACAUDAL; PERINEURAL at 13:11

## 2018-08-14 RX ADMIN — PROPOFOL 50 MG: 10 INJECTION, EMULSION INTRAVENOUS at 13:28

## 2018-08-14 RX ADMIN — PROPOFOL 200 MG: 10 INJECTION, EMULSION INTRAVENOUS at 13:11

## 2018-08-14 RX ADMIN — OXYCODONE HYDROCHLORIDE 10 MG: 5 TABLET ORAL at 14:25

## 2018-08-14 RX ADMIN — FENTANYL CITRATE 100 MCG: 50 INJECTION, SOLUTION INTRAMUSCULAR; INTRAVENOUS at 13:11

## 2018-08-14 RX ADMIN — DEXAMETHASONE SODIUM PHOSPHATE 10 MG: 4 INJECTION, SOLUTION INTRA-ARTICULAR; INTRALESIONAL; INTRAMUSCULAR; INTRAVENOUS; SOFT TISSUE at 13:20

## 2018-08-14 RX ADMIN — GLYCOPYRROLATE 0.4 MG: 0.2 INJECTION INTRAMUSCULAR; INTRAVENOUS at 13:51

## 2018-08-14 RX ADMIN — GLYCOPYRROLATE 0.2 MG: 0.2 INJECTION INTRAMUSCULAR; INTRAVENOUS at 13:57

## 2018-08-14 RX ADMIN — Medication 3 G: at 13:20

## 2018-08-14 RX ADMIN — PROPOFOL 50 MG: 10 INJECTION, EMULSION INTRAVENOUS at 13:13

## 2018-08-14 RX ADMIN — ROCURONIUM BROMIDE 30 MG: 10 INJECTION, SOLUTION INTRAVENOUS at 13:12

## 2018-08-14 RX ADMIN — MIDAZOLAM HYDROCHLORIDE 2 MG: 1 INJECTION, SOLUTION INTRAMUSCULAR; INTRAVENOUS at 12:04

## 2018-08-14 RX ADMIN — FAMOTIDINE 20 MG: 10 INJECTION INTRAVENOUS at 11:56

## 2018-08-14 RX ADMIN — NEOSTIGMINE METHYLSULFATE 2 MG: 1 INJECTION INTRAVENOUS at 13:51

## 2018-08-14 RX ADMIN — ACETAMINOPHEN 1000 MG: 500 TABLET, FILM COATED ORAL at 11:56

## 2018-08-14 RX ADMIN — HYDROMORPHONE HYDROCHLORIDE 0.8 MG: 2 INJECTION, SOLUTION INTRAMUSCULAR; INTRAVENOUS; SUBCUTANEOUS at 13:40

## 2018-08-14 RX ADMIN — KETOROLAC TROMETHAMINE 30 MG: 30 INJECTION, SOLUTION INTRAMUSCULAR; INTRAVENOUS at 13:52

## 2018-08-14 RX ADMIN — ONDANSETRON 4 MG: 2 INJECTION INTRAMUSCULAR; INTRAVENOUS at 13:20

## 2018-08-14 RX ADMIN — SODIUM CHLORIDE, SODIUM LACTATE, POTASSIUM CHLORIDE, AND CALCIUM CHLORIDE 125 ML/HR: 600; 310; 30; 20 INJECTION, SOLUTION INTRAVENOUS at 11:56

## 2018-08-14 NOTE — BRIEF OP NOTE
BRIEF OPERATIVE NOTE    Date of Procedure:  8/14/2018    Preoperative Diagnosis:   Incarcerated ventral hernia  Umbilical hernia  Postoperative Diagnosis: same    Procedure:    Open repair of incarcerated ventral hernia (no mesh)  Open repair of umbilical hernia (no mesh)  Surgeon(s) and Role:     * Alexandro Jackman MD - Primary  Anesthesia: General  Estimated Blood Loss: <30cc  Specimens: none  Findings: see op note   Complications: none  Implants: * No implants in log *

## 2018-08-14 NOTE — H&P (VIEW-ONLY)
H&P/Consult Note/Progress Note/Office Note:   Neftali Cárdenas  MRN: [id number]  :1995  Age:22 y.o.    HPI: Neftali Cárdenas is a 25 y.o. male who was referred for evaluation of ventral hernia. He and his mother report that early in childhood pediatricians told his mother that he had a ventral hernia and sent him to a surgeon. At that point there was a time when no one could palpate a hernia no surgery was done. He reports he has had some abdominal discomfort over the last few months and it has become worse lately. The pain is located over a palpable bulge he notices located above the umbilicus. He reports a recent CT scan for evaluation of testicular pain which confirmed a hernia and prompted his referral.  Nothing seems to make this better or worse. He has no associated fevers. He does have associated nausea. 18 CT abd/pelvis with oral and IV contrast  Hx: Abd pain; testicular pain radiating to abdomen. Neg testicular  US. ? Hernia, 25 years Male Testicular pain radiating into the abdomen over the  past few months. Neg testicular US     COMPARISON: None available     ABDOMEN:  Minimal dependent subsegmental atelectasis bilateral lung bases. Normal-appearing liver, gallbladder, spleen, pancreas, bilateral adrenal glands  and kidneys. Probable small round splenule anterior to the spleen. Normal  caliber abdominal aorta. No evidence of significant lymphadenopathy. Normal-appearing small bowel. No evidence of intraperitoneal free air or free  fluid. Small fat-containing umbilical hernia.     PELVIS:  Normal-appearing urinary bladder. Normal-appearing prostate and seminal  vesicles. Stool ball in the rectum. Normal-appearing colon and appendix. No  evidence of pelvic free fluid. No evidence of significant inguinal or pelvic  sidewall lymphadenopathy. There is no evidence of inguinal or femoral hernia.    Visualized osseous structures unremarkable.     IMPRESSION:   No acute pathology identified. Other incidental findings as above. I reviewed the CT images. In addition to the umbilical hernia that is mentioned in the report but not palpable on exam   is a ventral hernia located just above the umbilicus which is palpable on exam.    History reviewed. No pertinent past medical history. Past Surgical History:   Procedure Laterality Date    HX HEENT  12/17/2010    nose surgery     Current Outpatient Prescriptions   Medication Sig    doxycycline (MONODOX) 100 mg capsule Take 1 Cap by mouth two (2) times a day.  HYDROcodone-acetaminophen (NORCO) 5-325 mg per tablet Take 1 Tab by mouth every four (4) hours as needed for Pain. Max Daily Amount: 6 Tabs. No current facility-administered medications for this visit. Review of patient's allergies indicates no known allergies. Social History     Social History    Marital status: SINGLE     Spouse name: N/A    Number of children: N/A    Years of education: N/A     Social History Main Topics    Smoking status: Never Smoker    Smokeless tobacco: Never Used    Alcohol use Yes      Comment: socially    Drug use: No    Sexual activity: Not Asked     Other Topics Concern    None     Social History Narrative     History   Smoking Status    Never Smoker   Smokeless Tobacco    Never Used     Family History   Problem Relation Age of Onset    Hypertension Mother     Heart Disease Father      ROS: The patient has no difficulty with chest pain or shortness of breath. No fever or chills. Comprehensive review of systems was otherwise unremarkable except as noted above. Physical Exam:   Visit Vitals    /82 (BP 1 Location: Left arm, BP Patient Position: Sitting)    Pulse 82    Resp 19    Ht 5' 10\" (1.778 m)    Wt 269 lb 8 oz (122.2 kg)    SpO2 98%    BMI 38.67 kg/m2     Constitutional: Alert, oriented, cooperative patient in no acute distress; appears stated age    Eyes:Sclera are clear.  EOMs intact  ENMT: no external lesions gross hearing normal; no obvious neck masses, no ear or lip lesions, nares normal  CV: RRR. Normal perfusion  Resp: No JVD. Breathing is  non-labored; no audible wheezing. GI: soft and non-distended   He has a clearly palpable hernia when he does have a facet located about 2 cm above the umbilical ring which is reducible. I am unable to palpate the umbilical hernia that shows up on a CT imaging in the office per  Musculoskeletal: unremarkable with normal function. No embolic signs or cyanosis. Neuro:  Oriented; moves all 4; no focal deficits  Psychiatric: normal affect and mood, no memory impairment    Recent vitals (if inpt):  @IPVITALS(24:)@    Labs:  No results for input(s): WBC, HGB, PLT, NA, K, CL, CO2, BUN, CREA, GLU, PTP, INR, APTT, TBIL, TBILI, CBIL, SGOT, GPT, ALT, AP, AML, LPSE, LCAD, NH4, TROPT, TROIQ, PCO2, PO2, HCO3, HGBEXT, PLTEXT, HGBEXT, PLTEXT in the last 72 hours. No lab exists for component:  PH, INREXT, INREXT    Lab Results   Component Value Date/Time    WBC 6.3 07/29/2018 04:40 AM    HGB 14.6 07/29/2018 04:40 AM    PLATELET 369 89/57/8278 04:40 AM    Sodium 141 07/29/2018 04:40 AM    Potassium 3.9 07/29/2018 04:40 AM    Chloride 106 07/29/2018 04:40 AM    CO2 27 07/29/2018 04:40 AM    BUN 10 07/29/2018 04:40 AM    Creatinine 0.88 07/29/2018 04:40 AM    Glucose 104 (H) 07/29/2018 04:40 AM    INR 1.1 07/27/2018 05:54 PM    aPTT 25.1 07/27/2018 05:54 PM    Bilirubin, total 0.8 07/28/2018 03:48 AM    AST (SGOT) 18 07/28/2018 03:48 AM    ALT (SGPT) 44 07/28/2018 03:48 AM    Alk. phosphatase 68 07/28/2018 03:48 AM       I reviewed recent labs and recent radiologic studies. I independently reviewed radiology images for studies I described above or studies I have ordered.    Admission date (for inpatients): (Not on file)   [unfilled]  [unfilled]    ASSESSMENT/PLAN:  Problem List  Date Reviewed: 8/9/2018          Codes Class Noted    Umbilical hernia [id number]: K42.9  ICD-9-CM: 553.1  8/9/2018        Ventral hernia ICD-10-CM: K43.9  ICD-9-CM: 553.20  8/9/2018        No-show for appointment ICD-10-CM: Z53.29  ICD-9-CM: V64.2  7/30/2018        Cellulitis of left upper extremity ICD-10-CM: L03.114  ICD-9-CM: 682.3  7/27/2018        Severe obesity (BMI 35.0-39.9) ([facility] [address] ICD-10-CM: E66.01  ICD-9-CM: 278.01  7/16/2018            Active Problems:    * No active hospital problems. *       He has both an umbilical and a ventral hernia located just cephalad to the umbilicus. We discussed treatment for these. Operative risks involved with repair of both of these hernias was discussed including bleeding, infection, recurrence, injury to bowel, fistula, mesh infection requiring further surgery, risk of anesthesia, etc.. We also discussed risks of not having surgery including bowel incarceration and emergency procedures. All his questions were answered. He is in favor proceeding.     We will schedule him soon for umbilical hernia repair and ventral hernia repair with possible mesh      Signed:  Ulisses Redmond MD,  FACS

## 2018-08-14 NOTE — ANESTHESIA PREPROCEDURE EVALUATION
Anesthetic History               Review of Systems / Medical History  Patient summary reviewed, nursing notes reviewed and pertinent labs reviewed    Pulmonary                   Neuro/Psych              Cardiovascular                  Exercise tolerance: >4 METS     GI/Hepatic/Renal                Endo/Other        Obesity     Other Findings            Physical Exam    Airway  Mallampati: I  TM Distance: > 6 cm  Neck ROM: normal range of motion   Mouth opening: Normal     Cardiovascular  Regular rate and rhythm,  S1 and S2 normal,  no murmur, click, rub, or gallop             Dental  No notable dental hx       Pulmonary  Breath sounds clear to auscultation               Abdominal  GI exam deferred       Other Findings            Anesthetic Plan    ASA: 1  Anesthesia type: general            Anesthetic plan and risks discussed with: Patient

## 2018-08-14 NOTE — IP AVS SNAPSHOT
[place] Clonts 
 
 
 [address] [facility] [address] 
[phone] Patient: Dave Barraza MRN: [id number] AIX:3/35/7951 About your hospitalization You were admitted on:  August 14, 2018 [facility] You were discharged on:  August 14, 2018 Why you were hospitalized Your primary diagnosis was:  Not on File Follow-up Information Follow up With Details Comments Contact Info Alejandro Palmer DO   [address] 
[phone] Rachel Martines MD Follow up on 8/23/2018 3:45 PM [address] [facility] [address] 
[phone] Your Scheduled Appointments Tuesday August 14, 2018 HERNIA VENTRAL REPAIR with Rachel Martines MD  
[facility] ([address] [place] [address]) [address] [facility] [address] [facility]  
[phone] Wednesday August 22, 2018  8:15 AM EDT Office Visit with Nadia De La Cruz MD  
[facility] ([facility] [address]) [address] [address]  
[phone] Thursday August 23, 2018  3:45 PM EDT Global Post Op with Rachel Martines MD  
[place] [facility] ([facility] MAIN) [address] [place] [address] [facility]  
[phone] Discharge Orders None A check katie indicates which time of day the medication should be taken. My Medications CONTINUE taking these medications Instructions Each Dose to Equal  
 Morning Noon Evening Bedtime  
 clindamycin 300 mg capsule Commonly known as:  CLEOCIN Your last dose was: Your next dose is: Take 300 mg by mouth three (3) times daily. 300 mg  
    
   
   
   
  
 doxycycline 100 mg capsule Commonly known as:  Kane Mason Your last dose was: Your next dose is: Take 100 mg by mouth two (2) times a day. 100 mg Discharge Instructions Leave dressing alone until follow-up. Try to keep incision as dry as possible to lower risk of infection. No heavy lifting (>5lbs) for 6 weeks to reduce risk of developing a hernia in the incisions. No driving until you are off pain meds for 24hrs and have no pain with movements associated with driving. Pain prescription (Percocet) on chart for patient to use as needed for pain Follow-up with Dr Denver Grijalva in 9 days on a Thursday in the office at: 
[facility] [address] 
(Call for an appt time-->[phone]-->option 1) ACTIVITY · As tolerated and as directed by your doctor. · Bathe or shower as directed by your doctor. DIET · Clear liquids until no nausea or vomiting; then light diet for the first day. · Advance to regular diet on second day, unless your doctor orders otherwise. · If nausea and vomiting continues, call your doctor. PAIN 
· Take pain medication as directed by your doctor. · Call your doctor if pain is NOT relieved by medication. · DO NOT take aspirin of blood thinners unless directed by your doctor. DRESSING CARE  
 
 
CALL YOUR DOCTOR IF  
· Excessive bleeding that does not stop after holding pressure over the area · Temperature of 101 degrees F or above · Excessive redness, swelling or bruising, and/ or green or yellow, smelly discharge from incision AFTER ANESTHESIA · For the first 24 hours: DO NOT Drive, Drink alcoholic beverages, or Make important decisions. · Be aware of dizziness following anesthesia and while taking pain medication. APPOINTMENT DATE/ TIME 
 
YOUR DOCTOR'S PHONE NUMBER  
 
 
DISCHARGE SUMMARY from Nurse PATIENT INSTRUCTIONS: 
 
After general anesthesia or intravenous sedation, for 24 hours or while taking prescription Narcotics: · Limit your activities · Do not drive and operate hazardous machinery · Do not make important personal or business decisions · Do  not drink alcoholic beverages · If you have not urinated within 8 hours after discharge, please contact your surgeon on call. *  Please give a list of your current medications to your Primary Care Provider. *  Please update this list whenever your medications are discontinued, doses are 
    changed, or new medications (including over-the-counter products) are added. *  Please carry medication information at all times in case of emergency situations. These are general instructions for a healthy lifestyle: No smoking/ No tobacco products/ Avoid exposure to second hand smoke Surgeon General's Warning:  Quitting smoking now greatly reduces serious risk to your health. Obesity, smoking, and sedentary lifestyle greatly increases your risk for illness A healthy diet, regular physical exercise & weight monitoring are important for maintaining a healthy lifestyle You may be retaining fluid if you have a history of heart failure or if you experience any of the following symptoms:  Weight gain of 3 pounds or more overnight or 5 pounds in a week, increased swelling in our hands or feet or shortness of breath while lying flat in bed. Please call your doctor as soon as you notice any of these symptoms; do not wait until your next office visit. Recognize signs and symptoms of STROKE: 
 
F-face looks uneven A-arms unable to move or move unevenly S-speech slurred or non-existent T-time-call 911 as soon as signs and symptoms begin-DO NOT go Back to bed or wait to see if you get better-TIME IS BRAIN. \ 
 
  
  
  
Introducing [facility]! [facility] introduces [employer] patient portal. Now you can access parts of your medical record, email your doctor's office, and request medication refills online. 1. In your internet browser, go to [email or web address] 2. Click on the First Time User? Click Here link in the Sign In box. You will see the New Member Sign Up page. 3. Enter your [employer] Access Code exactly as it appears below. You will not need to use this code after youve completed the sign-up process. If you do not sign up before the expiration date, you must request a new code. · [employer] Access Code: [email or web address] Expires: 11/6/2018  5:19 AM 
 
4. Enter the last four digits of your Social Security Number (xxxx) and Date of Birth (mm/dd/yyyy) as indicated and click Submit. You will be taken to the next sign-up page. 5. Create a [employer] ID. This will be your [employer] login ID and cannot be changed, so think of one that is secure and easy to remember. 6. Create a [employer] password. You can change your password at any time. 7. Enter your Password Reset Question and Answer. This can be used at a later time if you forget your password. 8. Enter your e-mail address. You will receive e-mail notification when new information is available in [address] 9. Click Sign Up. You can now view and download portions of your medical record. 10. Click the Download Summary menu link to download a portable copy of your medical information. If you have questions, please visit the Frequently Asked Questions section of the [employer] website. Remember, [employer] is NOT to be used for urgent needs. For medical emergencies, dial 911. Now available from your iPhone and Android! Introducing Evgeny Denney As a [facility] patient, I wanted to make you aware of our electronic visit tool called Evgeny Denney. [facility] 24/7 allows you to connect within minutes with a medical provider 24 hours a day, seven days a week via a mobile device or tablet or logging into a secure website from your computer. You can access Evgeny Denney from anywhere in the [place].  
 
A virtual visit might be right for you when you have a simple condition and feel like you just dont want to get out of bed, or cant get away from work for an appointment, when your regular [facility] provider is not available (evenings, weekends or holidays), or when youre out of town and need minor care. Electronic visits cost only $49 and if the [employer] 24/7 provider determines a prescription is needed to treat your condition, one can be electronically transmitted to a nearby pharmacy*. Please take a moment to enroll today if you have not already done so. The enrollment process is free and takes just a few minutes. To enroll, please download the [employer] 24/7 sunshine to your tablet or phone, or visit [email or web address] to enroll on your computer. And, as an [address] [place] [address] patient with a [employer] account, the results of your visits will be scanned into your electronic medical record and your primary care provider will be able to view the scanned results. We urge you to continue to see your regular [facility]ana Loud provider for your ongoing medical care. And while your primary care provider may not be the one available when you seek a [employer] virtual visit, the peace of mind you get from getting a real diagnosis real time can be priceless. For more information on [employer], view our Frequently Asked Questions (FAQs) at [email or web address]. Sincerely, 
 
Steven Valdivia MD 
Chief Medical Officer [address] *:  certain medications cannot be prescribed via [employer] Providers Seen During Your Hospitalization Provider Specialty Primary office phone Fouzia Brady MD General Surgery [phone] Your Primary Care Physician (PCP) Primary Care Physician Office Phone Office Fax Lyubov Goss [phone] You are allergic to the following No active allergies Recent Documentation Height Weight BMI Smoking Status 1.778 m 121.2 kg 38.35 kg/m2 Never Smoker Emergency Contacts Name Discharge Info Relation Home Work Mobile [facility] DISCHARGE CAREGIVER [3] Mother [14] [phone] Patient Belongings The following personal items are in your possession at time of discharge: 
  Dental Appliances: None  Visual Aid: At home, Contacts, Glasses      Home Medications: None   Jewelry: None  Clothing: Footwear, Undergarments, Pants, Shirt    Other Valuables: None Please provide this summary of care documentation to your next provider. Signatures-by signing, you are acknowledging that this After Visit Summary has been reviewed with you and you have received a copy. Patient Signature:  ____________________________________________________________ Date:  ____________________________________________________________  
  
[facility] Provider Signature:  ____________________________________________________________ Date:  ____________________________________________________________

## 2018-08-14 NOTE — INTERVAL H&P NOTE
H&P Update:  Ann-Marie Wilson was seen and examined. History and physical has been reviewed. The patient has been examined.  There have been no significant clinical changes since the completion of the originally dated History and Physical.    Signed By: Kyaw Reed MD     August 14, 2018 12:10 PM

## 2018-08-14 NOTE — ANESTHESIA POSTPROCEDURE EVALUATION
Post-Anesthesia Evaluation and Assessment    Patient: Ulises Gresham MRN: [id number]  SSN: [id number]    YOB: 1995  Age: 25 y.o. Sex: male       Cardiovascular Function/Vital Signs  Visit Vitals    /66 (BP 1 Location: Left arm, BP Patient Position: Supine)    Pulse 60    Temp 36.5 °C (97.7 °F)    Resp 13    Ht 5' 10\" (1.778 m)    Wt 121.2 kg (267 lb 4.8 oz)    SpO2 95%    BMI 38.35 kg/m2       Patient is status post general anesthesia for Procedure(s):  OPEN HERNIA VENTRAL REPAIR. Nausea/Vomiting: None    Postoperative hydration reviewed and adequate. Pain:  Pain Scale 1: Numeric (0 - 10) (08/14/18 1439)  Pain Intensity 1: 3 (08/14/18 1439)   Managed    Neurological Status:   Neuro (WDL): Within Defined Limits (08/14/18 1439)  Neuro  Neurologic State: Drowsy (08/14/18 1406)  Orientation Level: Oriented X4 (08/14/18 1406)  Cognition: Follows commands (08/14/18 1406)  Speech: Clear (08/14/18 1406)  LUE Motor Response: Purposeful (08/14/18 1406)  LLE Motor Response: Purposeful (08/14/18 1406)  RUE Motor Response: Purposeful (08/14/18 1406)  RLE Motor Response: Purposeful (08/14/18 1406)   At baseline    Mental Status and Level of Consciousness: Arousable    Pulmonary Status:   O2 Device: Room air (08/14/18 1439)   Adequate oxygenation and airway patent    Complications related to anesthesia: None    Post-anesthesia assessment completed.  No concerns    Signed By: Danica Hooks MD     August 14, 2018

## 2018-08-14 NOTE — DISCHARGE INSTRUCTIONS
Leave dressing alone until follow-up. Try to keep incision as dry as possible to lower risk of infection. No heavy lifting (>5lbs) for 6 weeks to reduce risk of developing a hernia in the incisions. No driving until you are off pain meds for 24hrs and have no pain with movements associated with driving. Pain prescription (Percocet) on chart for patient to use as needed for pain  Follow-up with Dr Adeline Hinojosa in 9 days on a Thursday in the office at:  [address]  (Call for an appt time-->[phone]-->option 1)    ACTIVITY  · As tolerated and as directed by your doctor. · Bathe or shower as directed by your doctor. DIET  · Clear liquids until no nausea or vomiting; then light diet for the first day. · Advance to regular diet on second day, unless your doctor orders otherwise. · If nausea and vomiting continues, call your doctor. PAIN  · Take pain medication as directed by your doctor. · Call your doctor if pain is NOT relieved by medication. · DO NOT take aspirin of blood thinners unless directed by your doctor. DRESSING CARE       CALL YOUR DOCTOR IF   · Excessive bleeding that does not stop after holding pressure over the area  · Temperature of 101 degrees F or above  · Excessive redness, swelling or bruising, and/ or green or yellow, smelly discharge from incision    AFTER ANESTHESIA   · For the first 24 hours: DO NOT Drive, Drink alcoholic beverages, or Make important decisions. · Be aware of dizziness following anesthesia and while taking pain medication.      APPOINTMENT DATE/ TIME    YOUR DOCTOR'S PHONE NUMBER       DISCHARGE SUMMARY from Nurse    PATIENT INSTRUCTIONS:    After general anesthesia or intravenous sedation, for 24 hours or while taking prescription Narcotics:  · Limit your activities  · Do not drive and operate hazardous machinery  · Do not make important personal or business decisions  · Do  not drink alcoholic beverages  · If you have not urinated within 8 hours after discharge, please contact your surgeon on call. *  Please give a list of your current medications to your Primary Care Provider. *  Please update this list whenever your medications are discontinued, doses are      changed, or new medications (including over-the-counter products) are added. *  Please carry medication information at all times in case of emergency situations. These are general instructions for a healthy lifestyle:    No smoking/ No tobacco products/ Avoid exposure to second hand smoke    Surgeon General's Warning:  Quitting smoking now greatly reduces serious risk to your health. Obesity, smoking, and sedentary lifestyle greatly increases your risk for illness    A healthy diet, regular physical exercise & weight monitoring are important for maintaining a healthy lifestyle    You may be retaining fluid if you have a history of heart failure or if you experience any of the following symptoms:  Weight gain of 3 pounds or more overnight or 5 pounds in a week, increased swelling in our hands or feet or shortness of breath while lying flat in bed. Please call your doctor as soon as you notice any of these symptoms; do not wait until your next office visit. Recognize signs and symptoms of STROKE:    F-face looks uneven    A-arms unable to move or move unevenly    S-speech slurred or non-existent    T-time-call 911 as soon as signs and symptoms begin-DO NOT go       Back to bed or wait to see if you get better-TIME IS BRAIN.         \

## 2018-08-15 NOTE — OP NOTES
[facility] REPORT    Thanh Banda  MR#: [id number]  : 1995  ACCOUNT #: [de-identified]   DATE OF SERVICE: 2018    PREOPERATIVE DIAGNOSES:  1. Ventral incisional hernia. 2.  Umbilical hernia. POSTOPERATIVE DIAGNOSES:  1. Incarcerated ventral hernia. 2.  Umbilical hernia. PROCEDURES PERFORMED:  1. Open repair of incarcerated ventral hernia (no mesh). 2.  Open repair of umbilical hernia (no mesh). SURGEON:  Summer Burch MD    ASSISTANT:  None. ANESTHESIA:  General.    COMPLICATIONS:  None. ESTIMATED BLOOD LOSS:  Less than 30 mL. SPECIMENS REMOVED:  None. IMPLANTS:  None. OPERATIVE PROCEDURE:  The patient was taken to the operating room and placed in supine position. After adequate anesthesia was given, his abdomen was prepped and draped in sterile fashion with multiple layers of Betadine scrub and Betadine solution. Time-out protocol was followed. He was given prophylactic antibiotics. He was opened through a midline incision curved around the umbilicus and extended in the cephalad direction in the midline. The umbilical hernia was repaired first.  We detached the umbilical skin from the anterior aspect of the hernia sac. The hernia was dissected down to its base. It was opened and reduced. The hernia defect was less than 1 square cm. It was closed primarily in a transverse fashion with interrupted 0 PDS sutures without tension. It did not require mesh. Irrigation was used. Hemostasis was confirmed. We then turned our attention to the ventral hernia repair. We extended our incision in the midline in the cephalad direction. A fairly large amount of herniated omentum was encountered. This was dissected down to its base. There was a lot of herniated contents relative to the size of the very small hernia defect. This hernia could not be reduced. The omentum which was incarcerated was excised.   The hernia was ultimately reduced. The hernia defect was small and was closed with 4 interrupted 0 PDS sutures placed in a transverse fashion. The fascia was little thin, but the hernia defect was small and this was closed without tension. There was no way to even introduce the mesh in a subfascial location without enlarging the size of the defect. Additional irrigation was used. Hemostasis was confirmed. Local anesthetic was given. Incision was closed with deep dermal 3-0 Vicryl sutures and skin clips. A sterile dressing was placed consisting of 4 x 4's packed in the umbilicus and then covering the incision and then a large piece of Zerita Antoinette was placed across the abdomen to protect it postoperatively. The patient was taken to recovery in good condition. He tolerated the procedure well. There were no immediate complications.       MD EVELYN Bradley / [place]  D: 08/14/2018 14:06     T: 08/14/2018 20:30  JOB #: [id number]

## 2022-03-18 PROBLEM — M79.89 SWELLING OF LEFT HAND: Status: ACTIVE | Noted: 2018-08-13
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
## 2022-03-19 PROBLEM — Z91.199 NO-SHOW FOR APPOINTMENT: Status: ACTIVE | Noted: 2018-07-30

## 2022-03-19 PROBLEM — K42.9 UMBILICAL HERNIA: Status: ACTIVE | Noted: 2018-08-09

## 2022-03-19 PROBLEM — K43.6 INCARCERATED VENTRAL HERNIA: Status: ACTIVE | Noted: 2018-08-09

## 2022-03-19 PROBLEM — T79.A12A TRAUMATIC COMPARTMENT SYNDROME OF LEFT UPPER EXTREMITY (HCC): Status: ACTIVE | Noted: 2018-08-13

## 2022-03-20 PROBLEM — L03.114 CELLULITIS OF HAND, LEFT: Status: ACTIVE | Noted: 2018-07-27

## 2022-05-26 PROCEDURE — 88312 SPECIAL STAINS GROUP 1: CPT

## 2022-05-26 PROCEDURE — 88305 TISSUE EXAM BY PATHOLOGIST: CPT

## 2022-05-27 ENCOUNTER — HOSPITAL ENCOUNTER (OUTPATIENT)
Dept: LAB | Age: 27
Discharge: HOME | End: 2022-05-30

## 2023-06-26 ENCOUNTER — TELEPHONE (OUTPATIENT)
Dept: FAMILY MEDICINE CLINIC | Facility: CLINIC | Age: 28
End: 2023-06-26

## 2023-06-28 ENCOUNTER — OFFICE VISIT (OUTPATIENT)
Dept: FAMILY MEDICINE CLINIC | Facility: CLINIC | Age: 28
End: 2023-06-28
Payer: COMMERCIAL

## 2023-06-28 VITALS
OXYGEN SATURATION: 97 % | HEIGHT: 70 IN | BODY MASS INDEX: 41.4 KG/M2 | HEART RATE: 50 BPM | WEIGHT: 289.2 LBS | DIASTOLIC BLOOD PRESSURE: 100 MMHG | TEMPERATURE: 98 F | SYSTOLIC BLOOD PRESSURE: 140 MMHG

## 2023-06-28 DIAGNOSIS — R11.2 NAUSEA AND VOMITING, UNSPECIFIED VOMITING TYPE: Primary | ICD-10-CM

## 2023-06-28 DIAGNOSIS — R10.11 RUQ PAIN: ICD-10-CM

## 2023-06-28 DIAGNOSIS — R19.7 DIARRHEA, UNSPECIFIED TYPE: ICD-10-CM

## 2023-06-28 LAB
ALBUMIN SERPL-MCNC: 3.8 G/DL (ref 3.5–5)
ALBUMIN/GLOB SERPL: 1.1 (ref 0.4–1.6)
ALP SERPL-CCNC: 65 U/L (ref 50–136)
ALT SERPL-CCNC: 72 U/L (ref 12–65)
ANION GAP SERPL CALC-SCNC: 4 MMOL/L (ref 2–11)
AST SERPL-CCNC: 27 U/L (ref 15–37)
BASOPHILS # BLD: 0 K/UL (ref 0–0.2)
BASOPHILS NFR BLD: 1 % (ref 0–2)
BILIRUB SERPL-MCNC: 0.2 MG/DL (ref 0.2–1.1)
BUN SERPL-MCNC: 10 MG/DL (ref 6–23)
CALCIUM SERPL-MCNC: 9.3 MG/DL (ref 8.3–10.4)
CHLORIDE SERPL-SCNC: 106 MMOL/L (ref 101–110)
CHOLEST SERPL-MCNC: 127 MG/DL
CO2 SERPL-SCNC: 29 MMOL/L (ref 21–32)
CREAT SERPL-MCNC: 1.1 MG/DL (ref 0.8–1.5)
DIFFERENTIAL METHOD BLD: NORMAL
EOSINOPHIL # BLD: 0.2 K/UL (ref 0–0.8)
EOSINOPHIL NFR BLD: 4 % (ref 0.5–7.8)
ERYTHROCYTE [DISTWIDTH] IN BLOOD BY AUTOMATED COUNT: 12.3 % (ref 11.9–14.6)
GLOBULIN SER CALC-MCNC: 3.6 G/DL (ref 2.8–4.5)
GLUCOSE SERPL-MCNC: 95 MG/DL (ref 65–100)
HCT VFR BLD AUTO: 47.3 % (ref 41.1–50.3)
HDLC SERPL-MCNC: 35 MG/DL (ref 40–60)
HDLC SERPL: 3.6
HGB BLD-MCNC: 15.7 G/DL (ref 13.6–17.2)
IMM GRANULOCYTES # BLD AUTO: 0 K/UL (ref 0–0.5)
IMM GRANULOCYTES NFR BLD AUTO: 0 % (ref 0–5)
LDLC SERPL CALC-MCNC: 78 MG/DL
LIPASE SERPL-CCNC: 109 U/L (ref 73–393)
LYMPHOCYTES # BLD: 2.6 K/UL (ref 0.5–4.6)
LYMPHOCYTES NFR BLD: 44 % (ref 13–44)
MCH RBC QN AUTO: 29.5 PG (ref 26.1–32.9)
MCHC RBC AUTO-ENTMCNC: 33.2 G/DL (ref 31.4–35)
MCV RBC AUTO: 88.7 FL (ref 82–102)
MONOCYTES # BLD: 0.5 K/UL (ref 0.1–1.3)
MONOCYTES NFR BLD: 8 % (ref 4–12)
NEUTS SEG # BLD: 2.6 K/UL (ref 1.7–8.2)
NEUTS SEG NFR BLD: 43 % (ref 43–78)
NRBC # BLD: 0 K/UL (ref 0–0.2)
PLATELET # BLD AUTO: 288 K/UL (ref 150–450)
PMV BLD AUTO: 10.5 FL (ref 9.4–12.3)
POTASSIUM SERPL-SCNC: 4.1 MMOL/L (ref 3.5–5.1)
PROT SERPL-MCNC: 7.4 G/DL (ref 6.3–8.2)
RBC # BLD AUTO: 5.33 M/UL (ref 4.23–5.6)
SODIUM SERPL-SCNC: 139 MMOL/L (ref 133–143)
TRIGL SERPL-MCNC: 70 MG/DL (ref 35–150)
VLDLC SERPL CALC-MCNC: 14 MG/DL (ref 6–23)
WBC # BLD AUTO: 5.9 K/UL (ref 4.3–11.1)

## 2023-06-28 PROCEDURE — 99214 OFFICE O/P EST MOD 30 MIN: CPT | Performed by: PHYSICIAN ASSISTANT

## 2023-06-28 RX ORDER — PROMETHAZINE HYDROCHLORIDE 12.5 MG/1
12.5 TABLET ORAL EVERY 6 HOURS PRN
Qty: 20 TABLET | Refills: 2 | Status: SHIPPED | OUTPATIENT
Start: 2023-06-28

## 2023-06-28 RX ORDER — PROMETHAZINE HYDROCHLORIDE 12.5 MG/1
TABLET ORAL
COMMUNITY
Start: 2023-06-25 | End: 2023-06-28 | Stop reason: SDUPTHER

## 2023-06-28 RX ORDER — FAMOTIDINE 40 MG/1
40 TABLET, FILM COATED ORAL DAILY
COMMUNITY

## 2023-06-28 RX ORDER — LORATADINE 10 MG/1
10 TABLET ORAL DAILY
COMMUNITY
End: 2023-06-28

## 2023-06-29 ENCOUNTER — HOSPITAL ENCOUNTER (OUTPATIENT)
Dept: ULTRASOUND IMAGING | Age: 28
Discharge: HOME | End: 2023-07-02

## 2023-06-29 ENCOUNTER — TELEPHONE (OUTPATIENT)
Dept: FAMILY MEDICINE CLINIC | Facility: CLINIC | Age: 28
End: 2023-06-29

## 2023-06-29 DIAGNOSIS — R19.7 DIARRHEA, UNSPECIFIED TYPE: ICD-10-CM

## 2023-06-29 DIAGNOSIS — R10.11 RUQ PAIN: ICD-10-CM

## 2023-06-29 DIAGNOSIS — R11.2 NAUSEA AND VOMITING, UNSPECIFIED VOMITING TYPE: ICD-10-CM

## 2023-06-29 DIAGNOSIS — R11.2 NAUSEA AND VOMITING, UNSPECIFIED VOMITING TYPE: Primary | ICD-10-CM

## 2023-06-29 ASSESSMENT — ENCOUNTER SYMPTOMS
ABDOMINAL PAIN: 1
COUGH: 0
SHORTNESS OF BREATH: 0
DIARRHEA: 1
NAUSEA: 1

## (undated) DEVICE — BUTTON SWITCH PENCIL BLADE ELECTRODE, HOLSTER: Brand: EDGE

## (undated) DEVICE — KENDALL SCD EXPRESS SLEEVES, KNEE LENGTH, MEDIUM: Brand: KENDALL SCD

## (undated) DEVICE — TRAY PREP DRY W/ PREM GLV 2 APPL 6 SPNG 2 UNDPD 1 OVERWRAP

## (undated) DEVICE — AMD ANTIMICROBIAL GAUZE SPONGES,12 PLY USP TYPE VII, 0.2% POLYHEXAMETHYLENE BIGUANIDE HCI (PHMB): Brand: CURITY

## (undated) DEVICE — SOLUTION IV 1000ML 0.9% SOD CHL

## (undated) DEVICE — NEEDLE HYPO 21GA L1.5IN INTRAMUSCULAR S STL LATCH BVL UP

## (undated) DEVICE — INTENDED FOR TISSUE SEPARATION, AND OTHER PROCEDURES THAT REQUIRE A SHARP SURGICAL BLADE TO PUNCTURE OR CUT.: Brand: BARD-PARKER ® STAINLESS STEEL BLADES

## (undated) DEVICE — SUTURE VCRL SZ 3-0 L27IN ABSRB UD L26MM SH 1/2 CIR J416H

## (undated) DEVICE — SLIM BODY SKIN STAPLER: Brand: APPOSE ULC

## (undated) DEVICE — 3M™ IOBAN™ 2 ANTIMICROBIAL INCISE DRAPE 6650EZ: Brand: IOBAN™ 2

## (undated) DEVICE — 3M™ TEGADERM™ TRANSPARENT FILM DRESSING FRAME STYLE, 1626W, 4 IN X 4-3/4 IN (10 CM X 12 CM), 50/CT 4CT/CASE: Brand: 3M™ TEGADERM™

## (undated) DEVICE — SPONGE: SPECIALTY PEANUT XR 100/CS: Brand: MEDICAL ACTION INDUSTRIES

## (undated) DEVICE — SUTURE PDS II SZ 0 L18IN ABSRB VLT L36MM CT-1 1/2 CIR Z740D

## (undated) DEVICE — REM POLYHESIVE ADULT PATIENT RETURN ELECTRODE: Brand: VALLEYLAB

## (undated) DEVICE — SURGICAL PROCEDURE PACK BASIC ST FRANCIS

## (undated) DEVICE — SHEET, T, LAPAROTOMY, STERILE: Brand: MEDLINE